# Patient Record
Sex: MALE | Race: BLACK OR AFRICAN AMERICAN | Employment: UNEMPLOYED | URBAN - METROPOLITAN AREA
[De-identification: names, ages, dates, MRNs, and addresses within clinical notes are randomized per-mention and may not be internally consistent; named-entity substitution may affect disease eponyms.]

---

## 2020-06-06 ENCOUNTER — HOSPITAL ENCOUNTER (EMERGENCY)
Age: 50
Discharge: HOME | End: 2020-06-06
Attending: EMERGENCY MEDICINE
Payer: COMMERCIAL

## 2020-06-06 VITALS
HEIGHT: 72 IN | SYSTOLIC BLOOD PRESSURE: 157 MMHG | RESPIRATION RATE: 17 BRPM | WEIGHT: 220 LBS | OXYGEN SATURATION: 96 % | HEART RATE: 92 BPM | TEMPERATURE: 97.7 F | BODY MASS INDEX: 29.8 KG/M2 | DIASTOLIC BLOOD PRESSURE: 102 MMHG

## 2020-06-06 PROCEDURE — 6360000002 HC RX W HCPCS: Performed by: EMERGENCY MEDICINE

## 2020-06-06 PROCEDURE — 96372 THER/PROPH/DIAG INJ SC/IM: CPT

## 2020-06-06 PROCEDURE — 99283 EMERGENCY DEPT VISIT LOW MDM: CPT

## 2020-06-06 RX ORDER — TRIAMCINOLONE ACETONIDE 40 MG/ML
80 INJECTION, SUSPENSION INTRA-ARTICULAR; INTRAMUSCULAR ONCE
Status: COMPLETED | OUTPATIENT
Start: 2020-06-06 | End: 2020-06-06

## 2020-06-06 RX ORDER — CYCLOBENZAPRINE HCL 10 MG
10 TABLET ORAL 3 TIMES DAILY PRN
COMMUNITY
End: 2020-12-18 | Stop reason: ALTCHOICE

## 2020-06-06 RX ORDER — ALLOPURINOL 100 MG/1
100 TABLET ORAL DAILY
COMMUNITY
End: 2020-07-08

## 2020-06-06 RX ORDER — ATORVASTATIN CALCIUM 20 MG/1
20 TABLET, FILM COATED ORAL DAILY
COMMUNITY
End: 2020-07-13 | Stop reason: SDUPTHER

## 2020-06-06 RX ADMIN — TRIAMCINOLONE ACETONIDE 80 MG: 40 INJECTION, SUSPENSION INTRA-ARTICULAR; INTRAMUSCULAR at 21:31

## 2020-06-07 NOTE — ED NOTES
Nursing Discharge Notes:    -Patient discharged at this time in no acute distress after verbalizing understanding of discharge instructions and need for follow up with PCP and/or specialist if one was referred.  -A copy of the AVS was reviewed with pt and/or family.  -Pt received applicable scripts which were reviewed with pt and/or family by this RN. -Pt was given the opportunity to ask questions before signing for discharge. Patient Mobility at Discharge:    -Pt left ambulatory to lobby / discharge area. -Wheelchair offered and was declined. Patient Education:    Learner - Patient and/or family / caregiver. Motivation and Readiness To Learn - Medium to High  Barriers To Learning - None  Learning Preference / Provided Instructions - Both written and verbal discharge instructions.      Krystle Montana RN  06/06/20 9326

## 2020-06-08 ENCOUNTER — CARE COORDINATION (OUTPATIENT)
Dept: CARE COORDINATION | Age: 50
End: 2020-06-08

## 2020-06-08 NOTE — CARE COORDINATION
severity of symptoms and risk factors. Patient has a telephone f/u appointment with his PCP at the [place] on 6/9/20. He also has an appointment with ENT on 6/18/20. Declines needing any assistance with scheduling any f/u appointments.

## 2020-06-22 ENCOUNTER — CARE COORDINATION (OUTPATIENT)
Dept: CARE COORDINATION | Age: 50
End: 2020-06-22

## 2020-06-24 ENCOUNTER — TELEPHONE (OUTPATIENT)
Dept: FAMILY MEDICINE CLINIC | Age: 50
End: 2020-06-24

## 2020-06-24 NOTE — TELEPHONE ENCOUNTER
The following patient is requesting a new patient appointment    With Provider: any    Insurance: caresource    Medications / Conditions / Complaint : diabetic/cholesterol/high bp  Current issue being allergies    Preferred Days: any    Preferred Time: afternoon    Best Contact Number: [phone]

## 2020-07-06 ENCOUNTER — OFFICE VISIT (OUTPATIENT)
Dept: FAMILY MEDICINE CLINIC | Age: 50
End: 2020-07-06
Payer: COMMERCIAL

## 2020-07-06 VITALS
HEART RATE: 77 BPM | WEIGHT: 212.2 LBS | OXYGEN SATURATION: 98 % | SYSTOLIC BLOOD PRESSURE: 130 MMHG | HEIGHT: 70 IN | BODY MASS INDEX: 30.38 KG/M2 | DIASTOLIC BLOOD PRESSURE: 82 MMHG

## 2020-07-06 PROCEDURE — 99386 PREV VISIT NEW AGE 40-64: CPT | Performed by: FAMILY MEDICINE

## 2020-07-06 RX ORDER — FLUTICASONE PROPIONATE 50 MCG
1 SPRAY, SUSPENSION (ML) NASAL DAILY
COMMUNITY
End: 2022-06-28 | Stop reason: SDUPTHER

## 2020-07-06 RX ORDER — ALBUTEROL SULFATE 90 UG/1
2 AEROSOL, METERED RESPIRATORY (INHALATION) EVERY 6 HOURS PRN
COMMUNITY
End: 2022-06-28 | Stop reason: SDUPTHER

## 2020-07-06 RX ORDER — IBUPROFEN 600 MG/1
600 TABLET ORAL EVERY 6 HOURS PRN
COMMUNITY
End: 2021-02-03 | Stop reason: ALTCHOICE

## 2020-07-06 RX ORDER — IBUPROFEN 600 MG/1
600 TABLET ORAL EVERY 8 HOURS PRN
Qty: 30 TABLET | Refills: 0 | Status: SHIPPED | OUTPATIENT
Start: 2020-07-06 | End: 2022-06-28

## 2020-07-06 RX ORDER — CETIRIZINE HYDROCHLORIDE 10 MG/1
10 TABLET ORAL DAILY
COMMUNITY
End: 2022-06-28

## 2020-07-06 ASSESSMENT — PATIENT HEALTH QUESTIONNAIRE - PHQ9
2. FEELING DOWN, DEPRESSED OR HOPELESS: 1
SUM OF ALL RESPONSES TO PHQ9 QUESTIONS 1 & 2: 2
SUM OF ALL RESPONSES TO PHQ QUESTIONS 1-9: 2
SUM OF ALL RESPONSES TO PHQ QUESTIONS 1-9: 2
1. LITTLE INTEREST OR PLEASURE IN DOING THINGS: 1

## 2020-07-07 DIAGNOSIS — Z00.00 PHYSICAL EXAM, ANNUAL: ICD-10-CM

## 2020-07-07 DIAGNOSIS — K59.00 CONSTIPATION, UNSPECIFIED CONSTIPATION TYPE: ICD-10-CM

## 2020-07-07 DIAGNOSIS — M1A.0720 IDIOPATHIC CHRONIC GOUT OF LEFT FOOT WITHOUT TOPHUS: ICD-10-CM

## 2020-07-08 LAB
ANION GAP SERPL CALCULATED.3IONS-SCNC: 17 MMOL/L (ref 3–16)
BASOPHILS ABSOLUTE: 0.1 K/UL (ref 0–0.2)
BASOPHILS RELATIVE PERCENT: 1.2 %
BUN BLDV-MCNC: 15 MG/DL (ref 7–20)
CALCIUM SERPL-MCNC: 10.3 MG/DL (ref 8.3–10.6)
CHLORIDE BLD-SCNC: 100 MMOL/L (ref 99–110)
CHOLESTEROL, TOTAL: 248 MG/DL (ref 0–199)
CO2: 24 MMOL/L (ref 21–32)
CREAT SERPL-MCNC: 1.1 MG/DL (ref 0.9–1.3)
EOSINOPHILS ABSOLUTE: 0.2 K/UL (ref 0–0.6)
EOSINOPHILS RELATIVE PERCENT: 1.6 %
GFR AFRICAN AMERICAN: >60
GFR NON-AFRICAN AMERICAN: >60
GLUCOSE BLD-MCNC: 133 MG/DL (ref 70–99)
HCT VFR BLD CALC: 48.1 % (ref 40.5–52.5)
HDLC SERPL-MCNC: 52 MG/DL (ref 40–60)
HEMOGLOBIN: 15.3 G/DL (ref 13.5–17.5)
LDL CHOLESTEROL CALCULATED: 166 MG/DL
LYMPHOCYTES ABSOLUTE: 3.9 K/UL (ref 1–5.1)
LYMPHOCYTES RELATIVE PERCENT: 40.7 %
MCH RBC QN AUTO: 28.4 PG (ref 26–34)
MCHC RBC AUTO-ENTMCNC: 31.9 G/DL (ref 31–36)
MCV RBC AUTO: 88.9 FL (ref 80–100)
MONOCYTES ABSOLUTE: 0.5 K/UL (ref 0–1.3)
MONOCYTES RELATIVE PERCENT: 5.7 %
NEUTROPHILS ABSOLUTE: 4.9 K/UL (ref 1.7–7.7)
NEUTROPHILS RELATIVE PERCENT: 50.8 %
PDW BLD-RTO: 14.6 % (ref 12.4–15.4)
PLATELET # BLD: 273 K/UL (ref 135–450)
PMV BLD AUTO: 9.9 FL (ref 5–10.5)
POTASSIUM SERPL-SCNC: 4.2 MMOL/L (ref 3.5–5.1)
RBC # BLD: 5.41 M/UL (ref 4.2–5.9)
SODIUM BLD-SCNC: 141 MMOL/L (ref 136–145)
TRIGL SERPL-MCNC: 150 MG/DL (ref 0–150)
TSH REFLEX: 4 UIU/ML (ref 0.27–4.2)
URIC ACID, SERUM: 7.4 MG/DL (ref 3.5–7.2)
VLDLC SERPL CALC-MCNC: 30 MG/DL
WBC # BLD: 9.6 K/UL (ref 4–11)

## 2020-07-08 RX ORDER — ALLOPURINOL 100 MG/1
100 TABLET ORAL 2 TIMES DAILY
Qty: 60 TABLET | Refills: 0 | Status: SHIPPED | OUTPATIENT
Start: 2020-07-08 | End: 2020-08-03

## 2020-07-10 DIAGNOSIS — R73.01 ELEVATED FASTING GLUCOSE: ICD-10-CM

## 2020-07-10 LAB
ESTIMATED AVERAGE GLUCOSE: 157.1 MG/DL
HBA1C MFR BLD: 7.1 %

## 2020-07-13 ENCOUNTER — OFFICE VISIT (OUTPATIENT)
Dept: FAMILY MEDICINE CLINIC | Age: 50
End: 2020-07-13
Payer: COMMERCIAL

## 2020-07-13 VITALS
TEMPERATURE: 98.1 F | HEART RATE: 97 BPM | OXYGEN SATURATION: 98 % | WEIGHT: 205 LBS | BODY MASS INDEX: 29.41 KG/M2 | SYSTOLIC BLOOD PRESSURE: 150 MMHG | DIASTOLIC BLOOD PRESSURE: 99 MMHG

## 2020-07-13 PROCEDURE — 3051F HG A1C>EQUAL 7.0%<8.0%: CPT | Performed by: FAMILY MEDICINE

## 2020-07-13 PROCEDURE — G8417 CALC BMI ABV UP PARAM F/U: HCPCS | Performed by: FAMILY MEDICINE

## 2020-07-13 PROCEDURE — 99214 OFFICE O/P EST MOD 30 MIN: CPT | Performed by: FAMILY MEDICINE

## 2020-07-13 PROCEDURE — 1036F TOBACCO NON-USER: CPT | Performed by: FAMILY MEDICINE

## 2020-07-13 PROCEDURE — G8427 DOCREV CUR MEDS BY ELIG CLIN: HCPCS | Performed by: FAMILY MEDICINE

## 2020-07-13 PROCEDURE — 3017F COLORECTAL CA SCREEN DOC REV: CPT | Performed by: FAMILY MEDICINE

## 2020-07-13 PROCEDURE — 2022F DILAT RTA XM EVC RTNOPTHY: CPT | Performed by: FAMILY MEDICINE

## 2020-07-13 RX ORDER — ATORVASTATIN CALCIUM 40 MG/1
40 TABLET, FILM COATED ORAL DAILY
Qty: 90 TABLET | Refills: 3 | Status: SHIPPED | OUTPATIENT
Start: 2020-07-13 | End: 2021-10-04

## 2020-07-13 NOTE — PATIENT INSTRUCTIONS
Instructions. \"     If you do not have an account, please click on the \"Sign Up Now\" link. Current as of: August 22, 2019               Content Version: 12.5  © 4113-7264 [employer], [employer]orporated. Care instructions adapted under license by [facility] ([facility]). If you have questions about a medical condition or this instruction, always ask your healthcare professional. [address] any warranty or liability for your use of this information.

## 2020-07-13 NOTE — PROGRESS NOTES
Chief Complaint: Discuss Labs       HPI:  Reinaldo Duane is a 45 y.o. male here to discuss the labs. He has history of gout and his uric acid level was still elevated. He was taking allopurinol 100 mg daily. At times he still complains of pain in his knee and ankle joints. But no swelling or redness    He haS no history of hypertension. But today his blood pressure was elevated and he attributes to stress  He has blood pressure cuff at home and he would keep a log of the blood pressure    His lipid profile was abnormal he has been taking Lipitor 20 mg daily    His blood glucose was elevated. His A1c was 7. He never had any history of diabetes has a strong family history of type 2 diabetes    ROS:  Constitutional: Negative for appetite change, fatigue, fever and unexpected weight change. HENT: Negative   Respiratory: Negative for cough, chest tightness, shortness of breath and wheezing. Cardiovascular: Negative for chest pain, palpitations and leg swelling. Gastrointestinal: Negative for abdominal pain, blood in stool, constipation, diarrhea, nausea and vomiting. Genitourinary: Negative for difficulty urinating, flank pain, frequency, hematuria and urgency. Musculoskeletal: As mentioned above  Skin: Negative for color change, pallor, rash and wound. Neurological: Negative  Psychiatric/Behavioral: Negative     Patient's problem list, medications, allergies, past medical, surgical, social and family histories were reviewed and updated as appropriate.      Current Outpatient Medications   Medication Sig Dispense Refill    metFORMIN (GLUCOPHAGE) 500 MG tablet Take 1 tablet by mouth 2 times daily (with meals) 60 tablet 3    atorvastatin (LIPITOR) 40 MG tablet Take 1 tablet by mouth daily 90 tablet 3    allopurinol (ZYLOPRIM) 100 MG tablet Take 1 tablet by mouth 2 times daily 60 tablet 0    cetirizine (ZYRTEC) 10 MG tablet Take 10 mg by mouth daily      polyethyl glycol-propyl glycol 0.4-0.3 % (SYSTANE) 0.4-0.3 % ophthalmic solution 1 drop as needed for Dry Eyes      ibuprofen (ADVIL;MOTRIN) 600 MG tablet Take 600 mg by mouth every 6 hours as needed for Pain      diclofenac sodium (VOLTAREN) 1 % GEL Apply 2 g topically 2 times daily      albuterol sulfate HFA (VENTOLIN HFA) 108 (90 Base) MCG/ACT inhaler Inhale 2 puffs into the lungs every 6 hours as needed for Wheezing      fluticasone (FLONASE) 50 MCG/ACT nasal spray 1 spray by Each Nostril route daily      Hypertonic Nasal Wash (SINUS RINSE NA) by Nasal route      ibuprofen (ADVIL;MOTRIN) 600 MG tablet Take 1 tablet by mouth every 8 hours as needed for Pain 30 tablet 0    cyclobenzaprine (FLEXERIL) 10 MG tablet Take 10 mg by mouth 3 times daily as needed for Muscle spasms       No current facility-administered medications for this visit. Social History     Tobacco Use    Smoking status: Never Smoker    Smokeless tobacco: Never Used   Substance Use Topics    Alcohol use: Not Currently        Objective:     Vitals:    07/13/20 1101   BP: (!) 150/99   Pulse: 97   Temp: 98.1 °F (36.7 °C)   SpO2: 98%   Weight: 205 lb (93 kg)     Body mass index is 29.41 kg/m². Wt Readings from Last 3 Encounters:   07/13/20 205 lb (93 kg)   07/06/20 212 lb 3.2 oz (96.3 kg)   06/06/20 220 lb (99.8 kg)     BP Readings from Last 3 Encounters:   07/13/20 (!) 150/99   07/06/20 130/82   06/06/20 (!) 157/102       Physical exam:  Constitutional: he is oriented to person, place, and time. he appears well-developed and well-nourished. No distress. Cardiovascular: Normal rate, regular rhythm, normal heart sounds and intact distal pulses. No murmur heard. Pulmonary/Chest: Effort normal and breath sounds normal. No stridor. No respiratory distress. he has no wheezes. he has no rales. heexhibits no tenderness. Abdominal: Soft. Bowel sounds are normal. he exhibits no distension and no mass. There is no tenderness. There is no rebound and no guarding. Musculoskeletal: Normal range of motion. he exhibits no edema, tenderness or deformity. Neurological:he is alert and oriented to person, place, and time. he has gross neurological exam normal with normal strength and normal gait  Skin: Skin is warm and dry. No rash noted. he is not diaphoretic. No erythema. No pallor. Assessment/Plan:   1. Mixed hyperlipidemia  Increase the dose of Lipitor to 40 mg daily    2. Idiopathic chronic gout of right foot without tophus  Increase the dose of allopurinol 100 mg bid  And given the information about the diet    3. New onset type 2 diabetes mellitus ([facility] [address]  Started on metformin 500 mg bid and discussed about the diet and given information on the diabetes management and discussed about the signs of hypoglycemia and complications of diabetes    4. Elevated BP without diagnosis of hypertension  Advised to keep the log of the bp       Return in about 3 months (around 10/13/2020) for Chronic Medical Problems, Diabetes.       Manasa Haji  7/13/2020 11:26 AM

## 2020-08-03 RX ORDER — ALLOPURINOL 100 MG/1
TABLET ORAL
Qty: 60 TABLET | Refills: 5 | Status: SHIPPED | OUTPATIENT
Start: 2020-08-03 | End: 2020-10-12 | Stop reason: SDUPTHER

## 2020-08-03 NOTE — TELEPHONE ENCOUNTER
Medication:   Requested Prescriptions     Pending Prescriptions Disp Refills    allopurinol (ZYLOPRIM) 100 MG tablet [Pharmacy Med Name: ALLOPURINOL 100 MG TABLET] 60 tablet 0     Sig: TAKE 1 TABLET BY MOUTH TWICE A DAY        Last Filled:  7/8/2020 #60 w/0 refills     Patient Phone Number: [phone] (home)     Last appt: 7/13/2020   Next appt: 10/12/2020    Last OARRS: No flowsheet data found.

## 2020-10-12 ENCOUNTER — OFFICE VISIT (OUTPATIENT)
Dept: FAMILY MEDICINE CLINIC | Age: 50
End: 2020-10-12
Payer: COMMERCIAL

## 2020-10-12 VITALS
TEMPERATURE: 97.5 F | OXYGEN SATURATION: 97 % | SYSTOLIC BLOOD PRESSURE: 130 MMHG | BODY MASS INDEX: 29.5 KG/M2 | DIASTOLIC BLOOD PRESSURE: 74 MMHG | HEART RATE: 72 BPM | WEIGHT: 205.63 LBS

## 2020-10-12 PROBLEM — E11.9 TYPE 2 DIABETES MELLITUS WITHOUT COMPLICATION, WITHOUT LONG-TERM CURRENT USE OF INSULIN (HCC): Status: ACTIVE | Noted: 2020-10-12

## 2020-10-12 PROBLEM — M1A.0790 CHRONIC GOUT OF ANKLE: Status: ACTIVE | Noted: 2020-10-12

## 2020-10-12 PROBLEM — E78.2 MIXED HYPERLIPIDEMIA: Status: ACTIVE | Noted: 2020-10-12

## 2020-10-12 LAB
CREATININE URINE POCT: NORMAL
HBA1C MFR BLD: 6.5 %
MICROALBUMIN/CREAT 24H UR: NORMAL MG/G{CREAT}
MICROALBUMIN/CREAT UR-RTO: NORMAL

## 2020-10-12 PROCEDURE — 83036 HEMOGLOBIN GLYCOSYLATED A1C: CPT | Performed by: FAMILY MEDICINE

## 2020-10-12 PROCEDURE — G8482 FLU IMMUNIZE ORDER/ADMIN: HCPCS | Performed by: FAMILY MEDICINE

## 2020-10-12 PROCEDURE — 90750 HZV VACC RECOMBINANT IM: CPT | Performed by: FAMILY MEDICINE

## 2020-10-12 PROCEDURE — 90471 IMMUNIZATION ADMIN: CPT | Performed by: FAMILY MEDICINE

## 2020-10-12 PROCEDURE — 3017F COLORECTAL CA SCREEN DOC REV: CPT | Performed by: FAMILY MEDICINE

## 2020-10-12 PROCEDURE — 82044 UR ALBUMIN SEMIQUANTITATIVE: CPT | Performed by: FAMILY MEDICINE

## 2020-10-12 PROCEDURE — 3044F HG A1C LEVEL LT 7.0%: CPT | Performed by: FAMILY MEDICINE

## 2020-10-12 PROCEDURE — 90686 IIV4 VACC NO PRSV 0.5 ML IM: CPT | Performed by: FAMILY MEDICINE

## 2020-10-12 PROCEDURE — G8417 CALC BMI ABV UP PARAM F/U: HCPCS | Performed by: FAMILY MEDICINE

## 2020-10-12 PROCEDURE — G8427 DOCREV CUR MEDS BY ELIG CLIN: HCPCS | Performed by: FAMILY MEDICINE

## 2020-10-12 PROCEDURE — 1036F TOBACCO NON-USER: CPT | Performed by: FAMILY MEDICINE

## 2020-10-12 PROCEDURE — 99214 OFFICE O/P EST MOD 30 MIN: CPT | Performed by: FAMILY MEDICINE

## 2020-10-12 PROCEDURE — 2022F DILAT RTA XM EVC RTNOPTHY: CPT | Performed by: FAMILY MEDICINE

## 2020-10-12 PROCEDURE — 90472 IMMUNIZATION ADMIN EACH ADD: CPT | Performed by: FAMILY MEDICINE

## 2020-10-12 RX ORDER — ALLOPURINOL 100 MG/1
TABLET ORAL
Qty: 60 TABLET | Refills: 5 | Status: SHIPPED | OUTPATIENT
Start: 2020-10-12 | End: 2020-12-17 | Stop reason: SDUPTHER

## 2020-10-12 RX ORDER — LATANOPROST 50 UG/ML
SOLUTION/ DROPS OPHTHALMIC
COMMUNITY
Start: 2020-08-11 | End: 2022-06-28 | Stop reason: ALTCHOICE

## 2020-10-12 RX ORDER — GLUCOSAMINE HCL/CHONDROITIN SU 500-400 MG
CAPSULE ORAL
Qty: 100 STRIP | Refills: 1 | Status: SHIPPED | OUTPATIENT
Start: 2020-10-12

## 2020-10-12 NOTE — PROGRESS NOTES
Vaccine Information Sheet, \"Influenza - Inactivated\"  given to Chan Downs, or parent/legal guardian of  Chan Downs and verbalized understanding. Patient responses:    Have you ever had a reaction to a flu vaccine? No  Do you have any current illness? No  Have you ever had Guillian Bon Aqua Syndrome? No  Do you have a serious allergy to any of the follow: Neomycin, Polymyxin, Thimerosal, eggs or egg products? No    Flu vaccine given per order. Please see immunization tab. Risks and benefits explained. Current VIS given.

## 2020-10-12 NOTE — PROGRESS NOTES
Chief Complaint: Follow-up (3 month follow up diabetes)       HPI:  Suki Fontenot is a 45 y.o. male here for follow-up type 2 diabetes    His A1c has improved from 7-6.5. Currently has been taking Metformin 500 mg twice daily denies any side effects from the medication. Been checking his BGM regularly. His blood pressure has been stable. He has changed his diet and has been exercising. His gout is well controlled. He is currently taking allopurinol 100 mg daily    He is due for Shingrix shot and flu shot    ROS:  Constitutional: Negative for appetite change, fatigue, fever and unexpected weight change. Respiratory: Negative for cough, chest tightness, shortness of breath and wheezing. Cardiovascular: Negative for chest pain, palpitations and leg swelling. Gastrointestinal: Negative for abdominal pain, blood in stool, constipation, diarrhea, nausea and vomiting. Genitourinary: Negative for difficulty urinating, flank pain, frequency, hematuria and urgency. Musculoskeletal: Negative for gait problem, joint swelling and myalgias. Skin: Negative for color change, pallor, rash and wound. Neurological: Negative for dizziness, tremors, seizures, syncope, facial asymmetry, speech difficulty, weakness, light-headedness, numbness and headaches. Psychiatric/Behavioral: Negative     Patient's problem list, medications, allergies, past medical, surgical, social and family histories were reviewed and updated as appropriate. Current Outpatient Medications   Medication Sig Dispense Refill    latanoprost (XALATAN) 0.005 % ophthalmic solution INSTILL ONE DROP IN BOTH EYES AT BEDTIME      blood glucose monitor strips Test 1 times a day & as needed for symptoms of irregular blood glucose. Dispense sufficient amount for indicated testing frequency plus additional to accommodate PRN testing needs.  100 strip 1    Blood Glucose Monitoring Suppl (D-CARE GLUCOMETER) w/Device KIT 1 applicator by Does not Normal range of motion. No JVD present. No tracheal deviation present. No thyromegaly present. Cardiovascular: Normal rate, regular rhythm, normal heart sounds and intact distal pulses. No murmur heard. Pulmonary/Chest: Effort normal and breath sounds normal. No stridor. No respiratory distress. he has no wheezes. he has no rales. heexhibits no tenderness. Abdominal: Soft. Bowel sounds are normal. he exhibits no distension and no mass. There is no tenderness. There is no rebound and no guarding. Neurological:he is alert and oriented to person, place, and time. he has gross neurological exam normal with normal strength and normal gait  Skin: Skin is warm and dry. No rash noted. he is not diaphoretic. No erythema. No pallor. Psychiatric: he has a normal mood and affect. his   behavior is normal.      Assessment/Plan:   1. Type 2 diabetes mellitus without complication, without long-term current use of insulin (HCC)  Improved and continue the same  - CBC Auto Differential; Future  - Basic Metabolic Panel; Future  - Lipid Panel; Future  - Hemoglobin A1C; Future  - POCT microalbumin    2. Flu vaccine need  - INFLUENZA, QUADV, 3 YRS AND OLDER, IM PF, PREFILL SYR OR SDV, 0.5ML (AFLURIA QUADV, PF)    3. Need for shingles vaccine  - Zoster Subunit (SHINGRIX)    4. Mixed hyperlipidemia  Stable and continue the same    5.  Chronic gout of ankle, unspecified cause, unspecified laterality  Stable and continue the allopurinol          Yuni Camilo  10/12/2020 4:09 PM

## 2020-10-13 RX ORDER — LANCETS 30 GAUGE
1 EACH MISCELLANEOUS DAILY
Qty: 100 EACH | Refills: 2 | Status: SHIPPED | OUTPATIENT
Start: 2020-10-13

## 2020-12-14 ENCOUNTER — TELEPHONE (OUTPATIENT)
Dept: FAMILY MEDICINE CLINIC | Age: 50
End: 2020-12-14

## 2020-12-14 ENCOUNTER — NURSE TRIAGE (OUTPATIENT)
Dept: OTHER | Facility: CLINIC | Age: 50
End: 2020-12-14

## 2020-12-14 ENCOUNTER — VIRTUAL VISIT (OUTPATIENT)
Dept: FAMILY MEDICINE CLINIC | Age: 50
End: 2020-12-14
Payer: COMMERCIAL

## 2020-12-14 PROCEDURE — G8427 DOCREV CUR MEDS BY ELIG CLIN: HCPCS | Performed by: FAMILY MEDICINE

## 2020-12-14 PROCEDURE — 99213 OFFICE O/P EST LOW 20 MIN: CPT | Performed by: FAMILY MEDICINE

## 2020-12-14 PROCEDURE — 3017F COLORECTAL CA SCREEN DOC REV: CPT | Performed by: FAMILY MEDICINE

## 2020-12-14 RX ORDER — DOCUSATE SODIUM 100 MG/1
100 CAPSULE, LIQUID FILLED ORAL DAILY PRN
Qty: 30 CAPSULE | Refills: 0 | Status: SHIPPED | OUTPATIENT
Start: 2020-12-14 | End: 2021-01-29

## 2020-12-14 NOTE — PROGRESS NOTES
2020    TELEHEALTH EVALUATION -- Audio/Visual (During FJWNN-20 public health emergency)    HPI:    Joe Christy (:  1970) has requested an audio/video evaluation for the following concern(s): Pain in his neck radiating to his left shoulder  Ongoing since 3 months  He had a x-ray done which did show disc degeneration  He is not sure which level he has the disc degeneration. He was seen at [facility].  Was given muscle relaxant steroid and NSAID  But none of them has helped  He has done physical therapy for 2 months and it has not helped  He still continues to have the pain and tingling going to his left shoulder and hand. He also has been having hemorrhoids  He has noted blood in his stools. Denies any history of constipation    Review of Systems:  Gen:  Denies fever, chills, headaches. No weight loss  HEENT:  Denies cold symptoms, sore throat. CV:  Denies chest pain or tightness, palpitations. Pulm:  Denies shortness of breath, cough. Abd: As mentioned    Prior to Visit Medications    Medication Sig Taking?  Authorizing Provider   docusate sodium (COLACE) 100 MG capsule Take 1 capsule by mouth daily as needed for Constipation Yes Nickolas Mahmood MD   metFORMIN (GLUCOPHAGE) 500 MG tablet TAKE 1 TABLET BY MOUTH TWICE A DAY WITH MEALS Yes Nickolas Mahmood MD   Blood Glucose Monitoring Suppl KIT 1 kit by Does not apply route daily Dispense according to insurance formulary Yes Nickolas Mahmood MD   blood glucose test strips (ASCENSIA AUTODISC VI;ONE TOUCH ULTRA TEST VI) strip 1 each by In Vitro route daily Test as directed,Dispense according to insurance formulary Yes Nickolas Mahmood MD   Lancets MISC 1 each by Does not apply route daily Test as directed, Dispense according to insurance formulary Yes Nickolas Mahmood MD   latanoprost (XALATAN) 0.005 % ophthalmic solution INSTILL ONE DROP IN BOTH EYES AT BEDTIME Yes Historical Provider, MD blood glucose monitor strips Test 1 times a day & as needed for symptoms of irregular blood glucose. Dispense sufficient amount for indicated testing frequency plus additional to accommodate PRN testing needs. Yes Javid Metz MD   Blood Glucose Monitoring Suppl (D-CARE GLUCOMETER) w/Device KIT 1 applicator by Does not apply route daily Yes Javid Metz MD   allopurinol (ZYLOPRIM) 100 MG tablet TAKE 1 TABLET BY MOUTH TWICE A DAY Yes Javid Metz MD   cetirizine (ZYRTEC) 10 MG tablet Take 10 mg by mouth daily Yes Historical Provider, MD   polyethyl glycol-propyl glycol 0.4-0.3 % (SYSTANE) 0.4-0.3 % ophthalmic solution 1 drop as needed for Dry Eyes Yes Historical Provider, MD   ibuprofen (ADVIL;MOTRIN) 600 MG tablet Take 600 mg by mouth every 6 hours as needed for Pain Yes Historical Provider, MD   diclofenac sodium (VOLTAREN) 1 % GEL Apply 2 g topically 2 times daily Yes Historical Provider, MD   albuterol sulfate HFA (VENTOLIN HFA) 108 (90 Base) MCG/ACT inhaler Inhale 2 puffs into the lungs every 6 hours as needed for Wheezing Yes Historical Provider, MD   fluticasone (FLONASE) 50 MCG/ACT nasal spray 1 spray by Each Nostril route daily Yes Historical Provider, MD   Hypertonic Nasal Wash (SINUS RINSE NA) by Nasal route Yes Historical Provider, MD   cyclobenzaprine (FLEXERIL) 10 MG tablet Take 10 mg by mouth 3 times daily as needed for Muscle spasms Yes Historical Provider, MD   atorvastatin (LIPITOR) 40 MG tablet Take 1 tablet by mouth daily  Javid Metz MD   ibuprofen (ADVIL;MOTRIN) 600 MG tablet Take 1 tablet by mouth every 8 hours as needed for Pain  Javid Metz MD       Past Medical History:   Diagnosis Date    Enlarged prostate        No past surgical history on file.     Family History   Problem Relation Age of Onset    Prostate Cancer Father        No Known Allergies    Social History     Tobacco Use    Smoking status: Never Smoker    Smokeless tobacco: Never Used Substance Use Topics    Alcohol use: Not Currently    Drug use: Never          PHYSICAL EXAMINATION:  Vital Signs:   Patient-Reported Vitals 12/14/2020   Patient-Reported Weight 206   Patient-Reported Temperature 97.5        Respiratory rate appears normal      Constitutional: Appears well-developed and well-nourished. No apparent distress    Mental status: Alert and awake. Oriented to person/place/time. Able to follow commands    HENT: Normocephalic, atraumatic. Mouth/Throat: Mucous membranes are moist. External Ears Normal    Neck: No visualized mass   Pulmonary/Chest: Respiratory effort normal.  No visualized signs of difficulty breathing or respiratory distress        Musculoskeletal: rom at his neck and experience tingling sensation in his arm           ASSESSMENT/PLAN:  1. Degeneration of cervical intervertebral disc  We will get the mri as no improvement with PT and steriods and muscle relaxants  - Chaya Dominguez MD, Spine Surgery, [address] [place] [address];  [facility]    2 Hemorrhoids  Advised to colace and if not better we will reassess Chan Downs is a 45 y.o. male being evaluated by a Virtual Visit (video visit) encounter to address concerns as mentioned above. A caregiver was present when appropriate. Due to this being a TeleHealth encounter (During FBROW-93 public health emergency), evaluation of the following organ systems was limited: Vitals/Constitutional/EENT/Resp/CV/GI//MS/Neuro/Skin/Heme-Lymph-Imm. Pursuant to the emergency declaration under the [address] [place] [address], [address] [place] [address] and the [employer] and [employer] Act, this Virtual Visit was conducted with patient's (and/or legal guardian's) consent, to reduce the patient's risk of exposure to COVID-19 and provide necessary medical care. The patient (and/or legal guardian) has also been advised to contact this office for worsening conditions or problems, and seek emergency medical treatment and/or call 911 if deemed necessary. Patient identification was verified at the start of the visit: Yes    Total time spent on this encounter: 15 minutes. Services were provided through a video synchronous discussion virtually to substitute for in-person clinic visit. Patient was located in their home. Provider was located in the office. --Sara Collins MD on 12/14/2020 at 5:35 PM    An electronic signature was used to authenticate this note. Sean Mendoza

## 2020-12-14 NOTE — TELEPHONE ENCOUNTER
Reason for Disposition   Numbness or tingling on both sides of body and is a new symptom lasting > 24 hours    Answer Assessment - Initial Assessment Questions  1. SYMPTOM: \"What is the main symptom you are concerned about? \" (e.g., weakness, numbness)      Left neck, shoulder, arm pain and numbness an tingling. 2. ONSET: \"When did this start? \" (minutes, hours, days; while sleeping)      Ongoing for approx 1year, but worse within the last 4 days. 3. LAST NORMAL: \"When was the last time you were normal (no symptoms)? \"      Over 1 year ago. 4. PATTERN \"Does this come and go, or has it been constant since it started? \"  \"Is it present now? \"      Constant most of the time. States it does go away for a few minutes. 5. CARDIAC SYMPTOMS: \"Have you had any of the following symptoms: chest pain, difficulty breathing, palpitations? \"      Denies    6. NEUROLOGIC SYMPTOMS: \"Have you had any of the following symptoms: headache, dizziness, vision loss, double vision, changes in speech, unsteady on your feet? \"      Numbness, tingling and pain in left shoulder, arm and left neck    7. OTHER SYMPTOMS: \"Do you have any other symptoms? \"      Denies    8. PREGNANCY: \"Is there any chance you are pregnant? \" \"When was your last menstrual period? \"      N/A    Protocols used: NEUROLOGIC DEFICIT-ADULT-[place]    Pt reports having left neck, left shoulder, left arm pain, numbness and tingling for approx 1 year. Has had therapy and been told that he was told that he had two compressed discs. Reviewed for pt to have appt with PCP within 3 days or be seen at [facility] if no appts available. [facility] [address] provided care advice and instructed to call back with worsening symptoms. Attention Provider: Thank you for allowing me to participate in the care of your patient. The patient was connected to triage in response to information provided to the [facility].   Please do not respond through this encounter as the response is not directed to a shared pool.

## 2020-12-15 ENCOUNTER — TELEPHONE (OUTPATIENT)
Dept: ORTHOPEDIC SURGERY | Age: 50
End: 2020-12-15

## 2020-12-17 RX ORDER — ALLOPURINOL 100 MG/1
TABLET ORAL
Qty: 60 TABLET | Refills: 5 | Status: SHIPPED | OUTPATIENT
Start: 2020-12-17 | End: 2020-12-22

## 2020-12-17 NOTE — TELEPHONE ENCOUNTER
Medication and Quantity requested: allopurinol (ZYLOPRIM) 100 MG tablet  QTY:180     Last Visit  12/14    Pharmacy and phone number updated in [facility]:  Yes [facility] [id number]

## 2020-12-18 ENCOUNTER — OFFICE VISIT (OUTPATIENT)
Dept: ORTHOPEDIC SURGERY | Age: 50
End: 2020-12-18
Payer: COMMERCIAL

## 2020-12-18 VITALS — TEMPERATURE: 97.2 F | WEIGHT: 206 LBS | HEIGHT: 72 IN | BODY MASS INDEX: 27.9 KG/M2 | RESPIRATION RATE: 12 BRPM

## 2020-12-18 PROCEDURE — G8427 DOCREV CUR MEDS BY ELIG CLIN: HCPCS | Performed by: STUDENT IN AN ORGANIZED HEALTH CARE EDUCATION/TRAINING PROGRAM

## 2020-12-18 PROCEDURE — G8482 FLU IMMUNIZE ORDER/ADMIN: HCPCS | Performed by: STUDENT IN AN ORGANIZED HEALTH CARE EDUCATION/TRAINING PROGRAM

## 2020-12-18 PROCEDURE — G8417 CALC BMI ABV UP PARAM F/U: HCPCS | Performed by: STUDENT IN AN ORGANIZED HEALTH CARE EDUCATION/TRAINING PROGRAM

## 2020-12-18 PROCEDURE — 99244 OFF/OP CNSLTJ NEW/EST MOD 40: CPT | Performed by: STUDENT IN AN ORGANIZED HEALTH CARE EDUCATION/TRAINING PROGRAM

## 2020-12-18 RX ORDER — METHYLPREDNISOLONE 4 MG/1
TABLET ORAL
Qty: 1 KIT | Refills: 0 | Status: SHIPPED | OUTPATIENT
Start: 2020-12-18 | End: 2020-12-24

## 2020-12-18 RX ORDER — CYCLOBENZAPRINE HCL 10 MG
10 TABLET ORAL NIGHTLY PRN
Qty: 30 TABLET | Refills: 0 | Status: SHIPPED | OUTPATIENT
Start: 2020-12-18 | End: 2021-01-17

## 2020-12-18 NOTE — PROGRESS NOTES
New Patient: SPINE    Referring Provider:  Fazal Simmons MD    CHIEF COMPLAINT:    Chief Complaint   Patient presents with    Neck Pain     NP, CSP. No TANISHA. Pain and symptoms ongoing minimum 2 years, with worsening symptoms 8-9 days. He notes doing PT at [address] [place] [address] 1.5 to 2 years ago. No recent treatments for symptoms. MRI CSP scheduled for 12/23/20. No h/o of cervical spine surgery. No bowel or bladder symptoms.  Arm Pain     Left arm pain & numbness/tingling. He reports issues at times with lifting things with his left arm/hand. Left arm weakness present. He reports resting arm on a pillow relieves his symptoms. HISTORY OF PRESENT ILLNESS:      · The patient is being sent at the request of Fazal Simmons MD in consultation as a new spine patient for neck pain and left arm pain. The patient is a 45 y.o. male whom reports neck pain with radiation to the left arm. The patient denies any injury or event that may have caused the symptoms. The patient has no history of cervical spine surgery. The pain started 2 years ago and has been worsening in the last 8 to 9 days. The patient rates his pain a 4/10 describes it as sharp, throbbing, aching, numbness and tingling. Pain radiates to the left shoulder and jaw and is associated with numbness in the arm and hand. Bending, sleeping and lifting aggravate his symptoms. The patient has done physical therapy at [facility] 1-1/2 years ago. The patient also has a traction machine at home which does relieve his symptoms temporarily. He is having difficulty with lifting with his left arm and hand. The patient does have an MRI of the cervical spine scheduled for 12/23/2020. Resting his arm in a pillow relieves his symptoms. He has not had any other recent treatment for symptoms. He has tried Ibuprofen, diclofenac and muscle relaxants in the past which did help. He does not present with any aids or braces today in the office. Pain Assessment  Location of Pain: Neck  Location Modifiers: Posterior, Left(Arm)  Severity of Pain: 4  Quality of Pain: Sharp, Throbbing, Aching, Other (Comment)(Numbness/tingling)  Duration of Pain: Persistent  Frequency of Pain: Constant  Aggravating Factors: Other (Comment), Bending(Sleeping; Lifting)  Limiting Behavior: Yes  Relieving Factors: Rest  Result of Injury: No  Work-Related Injury: No  Are there other pain locations you wish to document?: No      Associated signs and symptoms:   Neurogenic bowel or bladder symptoms:  no   Perceived weakness:  no   Difficulty walking:  no    Recent Imaging (within past one year)   Xrays: no   MRI or CT of spine: no    Current/Past Treatment:   · Physical Therapy:  yes  · Chiropractic:  none  · Injection:  none  · Medications:   NSAIDS:  yes   Muscle relaxer:  yes   Steriods:  none   Neuropathic medications:  none   Opioids:  none  · Previous surgery:  no  · Previous surgical consult:  no  · Other:  · Infection control  · Tested positive for MRSA in past 12 months:  no  · Tested positive for MSSA \"staph infection\" in past 12 months: no  · Tested positive for VRE (Vancomycin Resistant Enterococci) in past 12 months:   no  · Currently on any antibiotics for an infection: no  · Anticoagulants:  · On a blood thinner:  no   · Any history of bleeding disorder: no   · MRI Contraindication: no   · Previous Pain Management: no                   Past Medical History:   Past Medical History:   Diagnosis Date    Enlarged prostate       Past Surgical History:     History reviewed. No pertinent surgical history.   Current Medications:     Current Outpatient Medications:     cyclobenzaprine (FLEXERIL) 10 MG tablet, Take 1 tablet by mouth nightly as needed for Muscle spasms, Disp: 30 tablet, Rfl: 0    methylPREDNISolone (MEDROL, ONEIDA,) 4 MG tablet, Take by mouth., Disp: 1 kit, Rfl: 0    allopurinol (ZYLOPRIM) 100 MG tablet, TAKE 1 TABLET BY MOUTH TWICE A DAY, Disp: 60 tablet, Rfl: 5   docusate sodium (COLACE) 100 MG capsule, Take 1 capsule by mouth daily as needed for Constipation, Disp: 30 capsule, Rfl: 0    metFORMIN (GLUCOPHAGE) 500 MG tablet, TAKE 1 TABLET BY MOUTH TWICE A DAY WITH MEALS, Disp: 180 tablet, Rfl: 1    Blood Glucose Monitoring Suppl KIT, 1 kit by Does not apply route daily Dispense according to insurance formulary, Disp: 1 kit, Rfl: 0    blood glucose test strips (ASCENSIA AUTODISC VI;ONE TOUCH ULTRA TEST VI) strip, 1 each by In Vitro route daily Test as directed,Dispense according to insurance formulary, Disp: 100 each, Rfl: 2    Lancets MISC, 1 each by Does not apply route daily Test as directed, Dispense according to insurance formulary, Disp: 100 each, Rfl: 2    latanoprost (XALATAN) 0.005 % ophthalmic solution, INSTILL ONE DROP IN BOTH EYES AT BEDTIME, Disp: , Rfl:     blood glucose monitor strips, Test 1 times a day & as needed for symptoms of irregular blood glucose. Dispense sufficient amount for indicated testing frequency plus additional to accommodate PRN testing needs. , Disp: 100 strip, Rfl: 1    Blood Glucose Monitoring Suppl (D-CARE GLUCOMETER) w/Device KIT, 1 applicator by Does not apply route daily, Disp: 30 kit, Rfl: 0    cetirizine (ZYRTEC) 10 MG tablet, Take 10 mg by mouth daily, Disp: , Rfl:     polyethyl glycol-propyl glycol 0.4-0.3 % (SYSTANE) 0.4-0.3 % ophthalmic solution, 1 drop as needed for Dry Eyes, Disp: , Rfl:     ibuprofen (ADVIL;MOTRIN) 600 MG tablet, Take 600 mg by mouth every 6 hours as needed for Pain, Disp: , Rfl:     diclofenac sodium (VOLTAREN) 1 % GEL, Apply 2 g topically 2 times daily, Disp: , Rfl:     albuterol sulfate HFA (VENTOLIN HFA) 108 (90 Base) MCG/ACT inhaler, Inhale 2 puffs into the lungs every 6 hours as needed for Wheezing, Disp: , Rfl:     fluticasone (FLONASE) 50 MCG/ACT nasal spray, 1 spray by Each Nostril route daily, Disp: , Rfl:     Hypertonic Nasal Wash (SINUS RINSE NA), by Nasal route, Disp: , Rfl:   atorvastatin (LIPITOR) 40 MG tablet, Take 1 tablet by mouth daily, Disp: 90 tablet, Rfl: 3    ibuprofen (ADVIL;MOTRIN) 600 MG tablet, Take 1 tablet by mouth every 8 hours as needed for Pain, Disp: 30 tablet, Rfl: 0  Allergies:  Patient has no known allergies. Social History:    reports that he has never smoked. He has never used smokeless tobacco. He reports previous alcohol use. He reports that he does not use drugs. Family History:   Family History   Problem Relation Age of Onset    Prostate Cancer Father             REVIEW OF SYSTEMS: ROS - 14 point     Constitutional: No fevers, chills, night sweats, unexplained weight loss  Eye: No vision changes or diplopia  ENT: No nasal congestion, postnasal drip or sore throat. No tinnitus  Respiratory: No cough or SOB  CV: No chest pain or palpitations  GI: No nausea, abdominal pain, stool changes  : No dysuria or hematuria  Skin: No new or changing skin lesions, no rashes  MSK: No joint swelling, morning stiffness, unusual joint pain, + neck and right arm pain  Neurological: No headache, confusion, syncope  Psychiatric: No excessive anxiety or depression  Endocrine: No polyuria or polydipsia  Hematologic: No lymph node enlargement or excessive bleeding  Immunologic:No history of immune deficiency or immunomodulating drugs           PHYSICAL EXAM:    Vitals: Temperature 97.2 °F (36.2 °C), resp. rate 12, height 6' (1.829 m), weight 206 lb (93.4 kg). GENERAL EXAM:  · General Apparence: Patient is adequately groomed with no evidence of malnutrition. · Psychiatric: Orientation: The patient is oriented to time, place and person. The patient's mood and affect are appropriate   · Vascular: Examination reveals no swelling and palpation reveals no tenderness in upper or lower extremities. Good capillary refill.    · The lymphatic examination of the neck, axillae and groin reveals all areas to be without enlargement or induration Sensation is intact without deficit in the upper and lower extremities to light touch and pinprick  · Coordination of the upper and lower extremities are normal.    CERVICAL EXAMINATION:  · Inspection: Local inspection shows no step-off or bruising. Cervical alignment is normal. No instability is noted. · Palpation and Percussion: No evidence of tenderness at the midline. Paraspinal tenderness is not present. There is no paraspinal spasm. · Range of Motion:  limited by 25% in all planes due to pain   · Strength: 5/5 bilateral upper extremities  · Special Tests:   Spurling's positive left and Neely's are negative bilaterally. Tirado and Impingement tests are negative bilaterally. · Skin:There are no rashes, ulcerations or lesions. · Reflexes: Bilaterally triceps, biceps and brachioradialis are 1+. Clonus absent bilaterally at the feet. No pathological reflexes are noted. · Gait & station: normal, patient ambulates without assistance, no ataxia  · Additional Examinations:  · RIGHT UPPER EXTREMITY:  Inspection/examination of the right upper extremity does not show any tenderness, deformity or injury. Range of motion is normal and pain-free. There is no gross instability. There are no rashes, ulcerations or lesions. Strength and tone are normal. No atrophy or abnormal movements are noted. · LEFT UPPER EXTREMITY: Inspection/examination of the left upper extremity does not show any tenderness, deformity or injury. Range of motion is normal and pain-free. There is no gross instability. There are no rashes, ulcerations or lesions. Strength and tone are normal. No atrophy or abnormal movements are noted. LUMBAR/SACRAL EXAMINATION:  · Inspection: Local inspection shows no step-off or bruising. Lumbar alignment is normal. No instability is noted. 2. Cervical spondylosis without myelopathy    3. DDD (degenerative disc disease), cervical    4. Neck pain        Plan (Medical Decision Making):    I discussed the diagnosis and the treatment options with Gregory Aquino today. In Summary:  The various treatment options were outlined and discussed with Gregory Aquino including:  Conservative care options: physical therapy, ice, medications, bracing, and activity modification. The indications for therapeutic injections. The indications for additional imaging/laboratory studies. The indications for (possible future) interventions. After considering the various options discussed, Gregory Aquino elected to pursue a course of treatment that includes the followin. Medications: I will add a Flexeril 10 mg PO nightly to the current regimen. Counseled on risks, benefits and alternatives and recommended not to take the medicine and drive or operate heavy machinery. I will prescribe a medrol dose pack to help with the inflammatory component of pain. Risks, benefits and alternatives were discussed. Recommended to stop any NSAIDs to reduce risk of GI bleed during the course of the dose pack. 2. PT:  Encouraged to continue with HEP. 3. Further studies: The patient does have an MRI scheduled for 2020.    4. Interventional:  After further imaging is obtained, interventional options will be reviewed and recommended.   Patient may be a candidate for cervical epidural steroid injection if no improvement with Medrol Dosepak and if the MRI correlates with the history and physical.    5. Healthy Lifestyle Measures:  Patient education material reviewing the following was distributed to Gregory Aquino  Anatomic drawings  Healthy lifestyle education  Osteoporosis prevention,   Back and neck pain educational information   Advanced imaging preparedness    Posture education   Proper lifting and carrying techniques,   Weight management  Quitting smoking and Minor ways to treat back pain  For further information regarding the spine conditions and to review interventional treatments the patient was directed to [facility].    6.  Follow up:  1-2 weeks    Naida Rodriges was instructed to call the office if his symptoms worsen or if new symptoms appear prior to the next scheduled visit. He is specifically instructed to contact the office between now & his scheduled appointment if he has concerns related to his condition or if he needs assistance in scheduling the above tests. He is welcome to call for an appointment sooner if he has any additional concerns or questions. Roseanne Martinez PA-C  Board Certified by the [employer] on Certification of Physician Assistants  [id number]  Partner of [facility] ([facility] [place] [facility])       This dictation was performed with a verbal recognition program [facility]) and it was checked for errors. It is possible that there are still dictated errors within this office note. If so, please bring any errors to my attention for an addendum. All efforts were made to ensure that this office note is accurate.

## 2020-12-22 RX ORDER — ALLOPURINOL 100 MG/1
TABLET ORAL
Qty: 180 TABLET | Refills: 1 | Status: SHIPPED | OUTPATIENT
Start: 2020-12-22 | End: 2021-10-01

## 2020-12-22 NOTE — TELEPHONE ENCOUNTER
Medication:   Requested Prescriptions     Pending Prescriptions Disp Refills    allopurinol (ZYLOPRIM) 100 MG tablet [Pharmacy Med Name: ALLOPURINOL 100 MG TABLET] 180 tablet 1     Sig: TAKE 1 TABLET BY MOUTH TWICE A DAY        Last Filled:  12/17/2020 #60 Refills 5    Patient Phone Number: [phone] (home)     Last appt: 12/14/2020   Next appt: 4/12/2021    Last OARRS: No flowsheet data found.

## 2020-12-23 ENCOUNTER — TELEPHONE (OUTPATIENT)
Dept: FAMILY MEDICINE CLINIC | Age: 50
End: 2020-12-23

## 2020-12-23 NOTE — TELEPHONE ENCOUNTER
Please call them and let them know he is been seen by ortho and they recommend it too as his symptoms are not better

## 2020-12-24 NOTE — TELEPHONE ENCOUNTER
Patient is scheduled on Monday the 28th for MRI Cervical Spine. Please contact [facility] to complete the peer to peer review. Thank you.     [facility] [phone]  Case # [id number]

## 2020-12-29 NOTE — TELEPHONE ENCOUNTER
HANANE is still needing a peer to peer for approval. If the peer to peer isn't completed and approved the patient will need to cancel his appointment.     [facility] [phone]  Case # [id number]

## 2020-12-29 NOTE — TELEPHONE ENCOUNTER
[facility] [phone] Number to call   Case # [id number]. Left message for patient to call office back. Please read note below, thanks.

## 2021-01-08 ENCOUNTER — OFFICE VISIT (OUTPATIENT)
Dept: ORTHOPEDIC SURGERY | Age: 51
End: 2021-01-08
Payer: COMMERCIAL

## 2021-01-08 VITALS — HEIGHT: 72 IN | RESPIRATION RATE: 12 BRPM | WEIGHT: 206 LBS | BODY MASS INDEX: 27.9 KG/M2 | TEMPERATURE: 97.7 F

## 2021-01-08 DIAGNOSIS — M54.12 CERVICAL RADICULOPATHY: Primary | ICD-10-CM

## 2021-01-08 DIAGNOSIS — M47.812 CERVICAL SPONDYLOSIS WITHOUT MYELOPATHY: ICD-10-CM

## 2021-01-08 DIAGNOSIS — M50.30 DDD (DEGENERATIVE DISC DISEASE), CERVICAL: ICD-10-CM

## 2021-01-08 PROCEDURE — 1036F TOBACCO NON-USER: CPT | Performed by: STUDENT IN AN ORGANIZED HEALTH CARE EDUCATION/TRAINING PROGRAM

## 2021-01-08 PROCEDURE — 99214 OFFICE O/P EST MOD 30 MIN: CPT | Performed by: STUDENT IN AN ORGANIZED HEALTH CARE EDUCATION/TRAINING PROGRAM

## 2021-01-08 PROCEDURE — G8417 CALC BMI ABV UP PARAM F/U: HCPCS | Performed by: STUDENT IN AN ORGANIZED HEALTH CARE EDUCATION/TRAINING PROGRAM

## 2021-01-08 PROCEDURE — 3017F COLORECTAL CA SCREEN DOC REV: CPT | Performed by: STUDENT IN AN ORGANIZED HEALTH CARE EDUCATION/TRAINING PROGRAM

## 2021-01-08 PROCEDURE — G8427 DOCREV CUR MEDS BY ELIG CLIN: HCPCS | Performed by: STUDENT IN AN ORGANIZED HEALTH CARE EDUCATION/TRAINING PROGRAM

## 2021-01-08 PROCEDURE — G8482 FLU IMMUNIZE ORDER/ADMIN: HCPCS | Performed by: STUDENT IN AN ORGANIZED HEALTH CARE EDUCATION/TRAINING PROGRAM

## 2021-01-08 RX ORDER — METHYLPREDNISOLONE 4 MG/1
TABLET ORAL
Qty: 1 KIT | Refills: 0 | Status: SHIPPED | OUTPATIENT
Start: 2021-01-08 | End: 2021-01-14

## 2021-01-08 NOTE — PROGRESS NOTES
Follow up: Srinath Crandall  1970  [id number]      CHIEF COMPLAINT:    Chief Complaint   Patient presents with    Neck Pain     F/u CSP;  MRI CSP cx'd and not yet completed         HISTORY OF PRESENT ILLNESS:  Mr. Paramjit Jarquin is a 45 y.o. male returns for a follow up visit for multiple medical problems. His current presenting problems are   1. Cervical radiculopathy    2. Cervical spondylosis without myelopathy    3. DDD (degenerative disc disease), cervical    .    As per information/history obtained from the PADT(patient assessment and documentation tool) - He complains of pain in the neck with radiation to the shoulders Left, arms Left and hands Left He rates the pain 4/10 and describes it as aching, throbbing, numbness. Pain is made worse by: lack of support of the arm and neck. He denies side effects from the current pain regimen. Patient reports that since the last follow up visit the physical functioning is unchanged, family/social relationships are unchanged, mood is worse and sleep patterns are worse, and that the overall functioning is unchanged. Patient denies neurological bowel or bladder. Mr. Paramjit Jarquin presents for follow-up of ongoing neck and left arm pain. The patient continues to complain of radiating left arm pain to the upper trapezius and down the arm into the hand. This is also associated with numbness and tingling in the left arm and pain in the left jaw. The patient states this is worse with no support for his arm or his neck. He does feel better with a neck pillow and something to support his arm. The patient did do physical therapy at [facility] from 11/6/19 to 8/28/2019 for a total of 20 visits for the diagnosis of radiculopathy, cervicothoracic. The patient has continued to home exercise program prescribed by the physical therapist at [facility] 3 times a week for the last 6 months. I will prescribe a Medrol Dosepak and physical therapy to include dry needling in the meantime until we can obtain an MRI of the cervical spine. Associated signs and symptoms:   Neurogenic bowel or bladder symptoms:  no   Perceived weakness:  no   Difficulty walking:  no              Past Medical History:   Past Medical History:   Diagnosis Date    Enlarged prostate       Past Surgical History:     History reviewed. No pertinent surgical history.   Current Medications:     Current Outpatient Medications:     methylPREDNISolone (MEDROL, ONEIDA,) 4 MG tablet, Take by mouth., Disp: 1 kit, Rfl: 0    allopurinol (ZYLOPRIM) 100 MG tablet, TAKE 1 TABLET BY MOUTH TWICE A DAY, Disp: 180 tablet, Rfl: 1    cyclobenzaprine (FLEXERIL) 10 MG tablet, Take 1 tablet by mouth nightly as needed for Muscle spasms, Disp: 30 tablet, Rfl: 0    docusate sodium (COLACE) 100 MG capsule, Take 1 capsule by mouth daily as needed for Constipation, Disp: 30 capsule, Rfl: 0    metFORMIN (GLUCOPHAGE) 500 MG tablet, TAKE 1 TABLET BY MOUTH TWICE A DAY WITH MEALS, Disp: 180 tablet, Rfl: 1   Blood Glucose Monitoring Suppl KIT, 1 kit by Does not apply route daily Dispense according to insurance formulary, Disp: 1 kit, Rfl: 0    blood glucose test strips (ASCENSIA AUTODISC VI;ONE TOUCH ULTRA TEST VI) strip, 1 each by In Vitro route daily Test as directed,Dispense according to insurance formulary, Disp: 100 each, Rfl: 2    Lancets MISC, 1 each by Does not apply route daily Test as directed, Dispense according to insurance formulary, Disp: 100 each, Rfl: 2    latanoprost (XALATAN) 0.005 % ophthalmic solution, INSTILL ONE DROP IN BOTH EYES AT BEDTIME, Disp: , Rfl:     blood glucose monitor strips, Test 1 times a day & as needed for symptoms of irregular blood glucose. Dispense sufficient amount for indicated testing frequency plus additional to accommodate PRN testing needs. , Disp: 100 strip, Rfl: 1    Blood Glucose Monitoring Suppl (D-CARE GLUCOMETER) w/Device KIT, 1 applicator by Does not apply route daily, Disp: 30 kit, Rfl: 0    cetirizine (ZYRTEC) 10 MG tablet, Take 10 mg by mouth daily, Disp: , Rfl:     polyethyl glycol-propyl glycol 0.4-0.3 % (SYSTANE) 0.4-0.3 % ophthalmic solution, 1 drop as needed for Dry Eyes, Disp: , Rfl:     ibuprofen (ADVIL;MOTRIN) 600 MG tablet, Take 600 mg by mouth every 6 hours as needed for Pain, Disp: , Rfl:     diclofenac sodium (VOLTAREN) 1 % GEL, Apply 2 g topically 2 times daily, Disp: , Rfl:     albuterol sulfate HFA (VENTOLIN HFA) 108 (90 Base) MCG/ACT inhaler, Inhale 2 puffs into the lungs every 6 hours as needed for Wheezing, Disp: , Rfl:     fluticasone (FLONASE) 50 MCG/ACT nasal spray, 1 spray by Each Nostril route daily, Disp: , Rfl:     Hypertonic Nasal Wash (SINUS RINSE NA), by Nasal route, Disp: , Rfl:     atorvastatin (LIPITOR) 40 MG tablet, Take 1 tablet by mouth daily, Disp: 90 tablet, Rfl: 3    ibuprofen (ADVIL;MOTRIN) 600 MG tablet, Take 1 tablet by mouth every 8 hours as needed for Pain, Disp: 30 tablet, Rfl: 0 Allergies:  Patient has no known allergies. Social History:    reports that he has never smoked. He has never used smokeless tobacco. He reports previous alcohol use. He reports that he does not use drugs. Family History:   Family History   Problem Relation Age of Onset    Prostate Cancer Father        REVIEW OF SYSTEMS:   CONSTITUTIONAL: Denies unexplained weight loss, fevers, chills or fatigue  NEUROLOGICAL: Denies unsteady gait or progressive weakness  MUSCULOSKELETAL: Denies joint swelling or redness  GI: Denies nausea, vomiting, diarrhea   : Denies bowel or bladder issues       PHYSICAL EXAM:    Vitals: Temperature 97.7 °F (36.5 °C), resp. rate 12, height 6' (1.829 m), weight 206 lb (93.4 kg). GENERAL EXAM:  · General Apparence: Patient is adequately groomed with no evidence of malnutrition. · Psychiatric: Orientation: The patient is oriented to time, place and person. The patient's mood and affect are appropriate   · Vascular: Examination reveals no swelling and palpation reveals no tenderness in upper or lower extremities. Good capillary refill. · The lymphatic examination of the neck, axillae and groin reveals all areas to be without enlargement or induration  · Sensation is intact without deficit in the upper and lower extremities to light touch and pinprick  · Coordination of the upper and lower extremities are normal.    CERVICAL EXAMINATION:  · Inspection: Local inspection shows no step-off or bruising. Cervical alignment is normal. No instability is noted. · Palpation and Percussion: No evidence of tenderness at the midline. Paraspinal tenderness is not present. There is no paraspinal spasm. Skin:There are no rashes, ulcerations or lesions  · Range of Motion:  limited by 50% in all planes due to pain   · Strength: 4/5 left shoulder abduction  · Special Tests:   Spurling's positive left and Neely's are negative bilaterally. Tirado and Impingement tests are negative bilaterally. · Skin:There are no rashes, ulcerations or lesions in right & left upper extremities. · Reflexes: Bilaterally triceps, biceps and brachioradialis are 1+. Clonus absent bilaterally at the feet. No pathological reflexes are noted. · Gait & station: normal, patient ambulates without assistance  · Additional Examinations:  · RIGHT UPPER EXTREMITY:  Inspection/examination of the right upper extremity does not show any tenderness, deformity or injury. Range of motion is unremarkable and pain-free. There is no gross instability. There are no rashes, ulcerations or lesions. Strength and tone are normal. No atrophy or abnormal movements are noted. · LEFT UPPER EXTREMITY: Inspection/examination of the left upper extremity does not show any tenderness, deformity or injury. Range of motion is unremarkable and pain-free. There is no gross instability. There are no rashes, ulcerations or lesions. Strength and tone are normal. No atrophy or abnormal movements are noted. · RIGHT LOWER EXTREMITY: Inspection/examination of the right lower extremity does not show any tenderness, deformity or injury. Range of motion is normal and pain-free. There is no gross instability. There are no rashes, ulcerations or lesions. Strength and tone are normal. No atrophy or abnormal movements are noted. · LEFT LOWER EXTREMITY:  Inspection/examination of the left lower extremity does not show any tenderness, deformity or injury. Range of motion is normal and pain-free. There is no gross instability. There are no rashes, ulcerations or lesions. Strength and tone are normal. No atrophy or abnormal movements are noted.       Diagnostic Testing:    No new diagnostics  Results for orders placed or performed in visit on 10/12/20   POCT glycosylated hemoglobin (Hb A1C)   Result Value Ref Range    Hemoglobin A1C 6.5 %   POCT microalbumin   Result Value Ref Range    Microalb, Ur 30mg/L     Creatinine Ur POCT 200mg/dL Microalbumin Creatinine Ratio 30mg/g normal      Impression:       1. Cervical radiculopathy    2. Cervical spondylosis without myelopathy    3. DDD (degenerative disc disease), cervical        Plan:  Clinical Course: Above diagnoses are worsening    I discussed the diagnosis and the treatment options with Gray Love today. In Summary:  The various treatment options were outlined and discussed with Gray Love including:  Conservative care options: physical therapy, ice, medications, bracing, and activity modification. The indications for therapeutic injections. The indications for additional imaging/laboratory studies. The indications for (possible future) interventions. After considering the various options discussed, Gray Love elected to pursue a course of treatment that includes the followin. Medications:  I will prescribe a medrol dose pack to help with the inflammatory component of pain. Risks, benefits and alternatives were discussed. Recommended to stop any NSAIDs to reduce risk of GI bleed during the course of the dose pack. 2. PT:  I will start the patient on a trial of PT to work on a cervical stabilization program to focus on stretching, strengthening, traction and modalities as indicated. 3. Further studies:  I will obtain a Cervical MR without contrast to evaluate for soft tissue pathology or stenosis contributing to the neck pain and paresthesias. 4. Interventional:  After further imaging is obtained, interventional options will be reviewed and recommended.     5. Follow up:  2-3 weeks Yamil Arzola was instructed to call the office if his symptoms worsen or if new symptoms appear prior to the next scheduled visit. He is specifically instructed to contact the office between now & his scheduled appointment if he has concerns related to his condition or if he needs assistance in scheduling the above tests. He is welcome to call for an appointment sooner if he has any additional concerns or questions. Ghulam Lopez PA-C   Board Certified by the [employer] on Certification of Physician Assistants  [address] [facility]             This dictation was performed with a verbal recognition program [facility]) and it was checked for errors. It is possible that there are still dictated errors within this office note. If so, please bring any errors to my attention for an addendum. All efforts were made to ensure that this office note is accurate.

## 2021-01-12 ENCOUNTER — HOSPITAL ENCOUNTER (OUTPATIENT)
Dept: PHYSICAL THERAPY | Age: 51
Setting detail: THERAPIES SERIES
Discharge: HOME | End: 2021-01-12
Payer: COMMERCIAL

## 2021-01-12 PROCEDURE — 97112 NEUROMUSCULAR REEDUCATION: CPT

## 2021-01-12 PROCEDURE — 97140 MANUAL THERAPY 1/> REGIONS: CPT

## 2021-01-12 PROCEDURE — 97161 PT EVAL LOW COMPLEX 20 MIN: CPT

## 2021-01-12 NOTE — FLOWSHEET NOTE
[address], [employer]    Physical Therapy Treatment Note/ Progress Report:     Date:  2021    Patient Name:  Henrry Vazquez    :  1970  MRN: [id number]  Medical/Treatment Diagnosis Information:  · Diagnosis: M54.12 cervical radiculopathy; M47.812 cervical spondylosis without myelopathy; M50.30 DDD, cervical  · Treatment Diagnosis: C29.4  Insurance/Certification information:  PT Insurance Information: [employer]; 30 visits; / not covered  Physician Information:  Referring Practitioner: GIANLUCA Bowen  Plan of care signed (Y/N):     Date of Patient follow up with Physician:      Progress Report: [x]  Yes  []  No     Functional Scale: NDI: 40%   Date: 2021    Date Range for reporting period:  Beginnin2021  Ending:      Progress report due (10 Rx/or 30 days whichever is less): 6863     Recertification due (POC duration/ or 90 days whichever is less): 2021     Visit # Insurance Allowable Auth Needed    not covered []Yes    [x]No     Pain level:  5/10     SUBJECTIVE:  See eval    OBJECTIVE: See eval  ? Observation:   ? Test measurements:      RESTRICTIONS/PRECAUTIONS: n/a    Exercises/Interventions:   Therapeutic Ex Wt / Resistance Sets/sec Reps Notes                                                                                                        Therapeutic Activities                                                                      Manual Intervention- 15       Shld /GH Mobs       Post Cap mobs       Thoracic/Rib manipulation 2'   Multilevel supine AP thrust   CT MT/Mobs 5'   Prone CT distraction mobilization   PROM MT       Cervical mobilization 5'   Prone C5-7   Prone thoracic mobilization 3'   T1-T4   NMR re-education- 15       Open books  1 10 bilat   Thoracic extension  1 10 Over towel roll                                                 Therapeutic Exercise and NMR EXR [x] (57902) Provided verbal/tactile cueing for activities related to strengthening, flexibility, endurance, ROM  for improvements in scapular, scapulothoracic and UE control with self care, reaching, carrying, lifting, house/yardwork, driving/computer work. [x] (32604) Provided verbal/tactile cueing for activities related to improving balance, coordination, kinesthetic sense, posture, motor skill, proprioception  to assist with  scapular, scapulothoracic and UE control with self care, reaching, carrying, lifting, house/yardwork, driving/computer work. Therapeutic Activities:    [] (73899 or 97324) Provided verbal/tactile cueing for activities related to improving balance, coordination, kinesthetic sense, posture, motor skill, proprioception and motor activation to allow for proper function of scapular, scapulothoracic and UE control with self care, carrying, lifting, driving/computer work.      Home Exercise Program:    [x] (58103) Reviewed/Progressed HEP activities related to strengthening, flexibility, endurance, ROM of scapular, scapulothoracic and UE control with self care, reaching, carrying, lifting, house/yardwork, driving/computer work  [] (34319) Reviewed/Progressed HEP activities related to improving balance, coordination, kinesthetic sense, posture, motor skill, proprioception of scapular, scapulothoracic and UE control with self care, reaching, carrying, lifting, house/yardwork, driving/computer work      Manual Treatments:  PROM / STM / Oscillations-Mobs:  G-I, II, III, IV (PA's, Inf., Post.) [x] (71432) Provided manual therapy to mobilize soft tissue/joints of cervical/CT, scapular GHJ and UE for the purpose of modulating pain, promoting relaxation,  increasing ROM, reducing/eliminating soft tissue swelling/inflammation/restriction, improving soft tissue extensibility and allowing for proper ROM for normal function with self care, reaching, carrying, lifting, house/yardwork, driving/computer work    Modalities:      Charges:  Timed Code Treatment Minutes: 25   Total Treatment Minutes: 45       [x] EVAL (LOW) 26842 (typically 20 minutes face-to-face)  [] EVAL (MOD) 65192 (typically 30 minutes face-to-face)  [] EVAL (HIGH) 53094 (typically 45 minutes face-to-face)  [] RE-EVAL     [] MM(02536) x     [] IONTO (37795)  [x] NMR (04867) x 1    [] VASO (94634)  [x] Manual (19026) x 1    [] Other:  [] TA (17959)x     [] Mech Traction (44393)  [] ES(attended) (90275)     [] ES (un) (29451):         GOALS:  Patient stated goal: \"get rid of pain and be able to sleep better\"  [] Progressing: [] Met: [x] Not Met: [] Adjusted    Therapist goals for Patient:   Short Term Goals: To be achieved in: 2 weeks  1. Independent in HEP and progression per patient tolerance, in order to prevent re-injury. [] Progressing: [] Met: [x] Not Met: [] Adjusted    2. Patient will have a decrease in pain to facilitate improvement in movement, function, and ADLs as indicated by Functional Deficits. [] Progressing: [] Met: [x] Not Met: [] Adjusted    Long Term Goals: To be achieved in: 4 weeks  1. Disability index score of 10% or less for the NDI to assist with reaching prior level of function. [] Progressing: [] Met: [x] Not Met: [] Adjusted  2. Patient will demonstrate increased AROM to [facility] of cervical/thoracic spine to allow for proper joint functioning as indicated by patients Functional Deficits.    [] Progressing: [] Met: [x] Not Met: [] Adjusted 3. Patient will demonstrate an increase in postural awareness and control and activation of  Deep cervical stabilizers to allow for proper functional mobility as indicated by patients Functional Deficits. [] Progressing: [] Met: [x] Not Met: [] Adjusted  4. Patient will return to daily functional activities without increased symptoms or restriction. [] Progressing: [] Met: [x] Not Met: [] Adjusted   5. Pt will be able to sleep through the night with increased symptoms or restrictions. [] Progressing: [] Met: [x] Not Met: [] Adjusted      ASSESSMENT:  See eval      Treatment/Activity Tolerance:  [x] Patient tolerated treatment well [] Patient limited by fatique  [] Patient limited by pain  [] Patient limited by other medical complications  [] Other:     Overall Progression Towards Functional goals/ Treatment Progress Update:  [] Patient is progressing as expected towards functional goals listed. [] Progression is slowed due to complexities/Impairments listed. [] Progression has been slowed due to co-morbidities. [x] Plan just implemented, too soon to assess goals progression <30days   [] Goals require adjustment due to lack of progress  [] Patient is not progressing as expected and requires additional follow up with physician  [] Other    Prognosis for POC: [x] Good [] Fair  [] Poor    Patient requires continued skilled intervention: [x] Yes  [] No      PLAN: See eval  [] Continue per plan of care [] Alter current plan (see comments)  [x] Plan of care initiated [] Hold pending MD visit [] Discharge    Electronically signed by: Liya Ramsey, PT     Note: If patient does not return for scheduled/recommended follow up visits, this note will serve as a discharge from care along with the most recent update on progress.

## 2021-01-12 NOTE — PLAN OF CARE
[facility] [address]  Phone [phone]   Fax [phone]                                                       Physical Therapy Certification    Dear Referring Practitioner: GIANLUCA Pike,    We had the pleasure of evaluating the following patient for physical therapy services at [address] [place] [address]. A summary of our findings can be found in the initial assessment below. This includes our plan of care. If you have any questions or concerns regarding these findings, please do not hesitate to contact me at the office phone number checked above. Thank you for the referral.       Physician Signature:_______________________________Date:__________________  By signing above (or electronic signature), therapists plan is approved by physician      Patient: Miguel Angel Montenegro   : 1970   MRN: [id number]  Referring Physician: Referring Practitioner: GIANLUCA Pike      Evaluation Date: 2021      Medical Diagnosis Information:  Diagnosis: M54.12 cervical radiculopathy; M47.812 cervical spondylosis without myelopathy; M50.30 DDD, cervical   Treatment Diagnosis: M54.2                                         Insurance information:  Insurance Information: [facility]; 30 visits; [id number] not covered    Precautions/ Contra-indications: n/a    C-SSRS Triggered by Intake questionnaire (Past 2 wk assessment):   [] No, Questionnaire did not trigger screening.    [x] Yes, Patient intake triggered further evaluation      [x] C-SSRS Screening completed- denies SI/HI; no to all red flag questions; states feelings stem from how long he has been dealing with his pain and life stressors related to family and COVID  [] PCP notified via Plan of Care  [] Emergency services notified     Latex Allergy:  [x]NO      []YES  Preferred Language for Healthcare:   [x]English       []Other: SUBJECTIVE: Patient stated complaint:  Pt presents for evaluation of neck and left arm pain. Pt states symptoms started 1-2 years ago without traumatic incident. Pt states pain started in the neck, described as dull ache and sore and slowly started going to his shoulder, arm, and hand over time. States pain radiates down to his left elbow and has N/T down into his hand that he feels like he needs to shake out. Does get sharp pain w/ quick head and L arm movements. Denies dropping items. Pt is RHD.      Relevant Medical History: anxiety, depression, DM II, HTN  Functional Disability Index: NDI: 40%    Pain Scale: 5/10  Easing factors: laying on left side, diclofenac, steroids, putting L hand on head  Provocative factors: sleeping, repetitive lifting w/ LUE, turning head too fast     Type: []Constant   [x]Intermittent  [x]Radiating []Localized []other:     Numbness/Tingling: endorses N/T in upper L arm and L hand;     Occupation/School:      Living Status/Prior Level of Function: Independent with ADLs and IADLs    OBJECTIVE:     CERV ROM      Cervical Flexion     Cervical Extension 30     Left Right   Cervical SB     Cervical rotation 40 70   Quadrant     Dorsal Glide      UE ROM  Left Right   Shoulder Flex     Shoulder Abd     Shoulder ER     Shoulder IR     Elbow flex/ext     Wrist flex/ext/pro/sup     Finger flex/ext/opposition     Shoulder AROM WNL w OP X X   UE Strength  Left Right   Shoulder Flex 5 5   Shoulder Scap 4+ 5   Shoulder ABD (C5) 5 5   Shoulder ER  5 5   Shoulder IR 5 5   Elbow Flex (C5) 5 5   Elbow Ext (C7 Radial) 4+ 5   Wrist Flex (C6 Radial) 4+ 5   Wrist Ext (C7 Radial) 4+ 5   Finger flex (C8 median) 5 5   Finger ext (C7 Radial-PIN) 5 5   APB (T1 Median) 5 5   Finger Abd (T1 Ulnar) 5 5   UE myotomes WNL        Reflexes Normal Abnormal Comments   S1-2 Seated achilles [x] []    S1-2 Prone knee bend [x] []    L3-4 Patellar tendon [] []    C5-6 Biceps [x] []    C6 Brachioradialis [x] [] C7-8 Triceps [x] []      Balance:     Joint mobility: C6-T4   []Normal    [x]Hypo   []Hyper    Palpation: C6 and C7 dermatomes exhibit limited light touch as compared to right. Functional Mobility/Transfers: WFL    Posture: forward head, rounded shoulder, increased upper thoracic kyphosis    Bandages/Dressings/Incisions: N/A    Gait: (include devices/WB status): WNL    Neurodynamics: + ULNT    Orthopedic Special Tests:      Screening Testing  Normal Abnormal N/A Comments   Babinski Test [x] [] []    Neely Test [x] [] []    Inverted Sup Sign [x] [] []    Alar Ligament Test (spinous kick) [x] [] []    Transverse Ligament Test [x] [] []    Sharp-Lexie Test [x] [] []    Hautards Test [x] [] []    Vertebral Artery Test [x] [] []             Orthopedic Special Tests Normal Abnormal N/A Comments   Spurling Maneuver: * [] [x] []    Distraction testing:* [] [x] []    ULTT* [] [x] []    Rotation < 60 deg involved side* [] [x] []    Shoulder Abd test [] [x] []    Cerv Rot/Lat Flex- 1st Rib [x] [] []    Deep Neck Flex/endurance testing [x] [] []    Craniocerv Flex testing /Cuff [x] [] []    End Range Tolerance testing. [x] [] []     [] [] []    *clinical cluster for cervical radiculopathy          [x] Patient history, allergies, meds reviewed. Medical chart reviewed. See intake form. Review Of Systems (ROS):  [x]Performed Review of systems (Integumentary, CardioPulmonary, Neurological) by intake and observation. Intake form has been scanned into medical record. Patient has been instructed to contact their primary care physician regarding ROS issues if not already being addressed at this time.       Co-morbidities/Complexities (which will affect course of rehabilitation):   []None           Arthritic conditions   []Rheumatoid arthritis (M05.9)  []Osteoarthritis (M19.91)   Cardiovascular conditions   [x]Hypertension (I10)  []Hyperlipidemia (E78.5)  []Angina pectoris (I20)  []Atherosclerosis (I70) Musculoskeletal conditions   []Disc pathology   []Congenital spine pathologies   []Prior surgical intervention  []Osteoporosis (M81.8)  []Osteopenia (M85.8)   Endocrine conditions   []Hypothyroid (E03.9)  []Hyperthyroid Gastrointestinal conditions   []Constipation (J03.36)   Metabolic conditions   []Morbid obesity (E66.01)  [x]Diabetes type 1(E10.65) or 2 (E11.65)   []Neuropathy (G60.9)     Pulmonary conditions   []Asthma (J45)  []Coughing   []COPD (J44.9)   Psychological Disorders  [x]Anxiety (F41.9)  [x]Depression (F32.9)   []Other:   []Other:          Barriers to/and or personal factors that will affect rehab potential:              []Age  []Sex              []Motivation/Lack of Motivation                        []Co-Morbidities              []Cognitive Function, education/learning barriers              []Environmental, home barriers              []profession/work barriers  []past PT/medical experience  []other:  Justification:     Falls Risk Assessment (30 days):   [x] Falls Risk assessed and no intervention required. [] Falls Risk assessed and Patient requires intervention due to being higher risk   TUG score (>12s at risk):     [] Falls education provided, including       ASSESSMENT: Pt reported decreased symptoms and relief of N/T in hand w/ mobilizations to C7 and CT junction, decreased further following supine thoracic AP manipulation. General limited spinal mobility contributing to increased nerve root irritation C6/C7 consistent w/ abnormal strength and dermatome testing.    Functional Impairments:     [x]Noted cervical/thoracic/GHJ joint hypomobility   []Noted cervical/thoracic/GHJ joint hypermobility   [x]Decreased cervical/UE functional ROM   []Noted Headache pain aggravated by neck movements with/without dizziness   [x]Abnormal reflexes/sensation/myotomal/dermatomal deficits   [x]Decreased DCF control or ability to hold head up   [x]Decreased RC/scapular/core strength and neuromuscular control [x]Decreased UE functional strength   []other:      Functional Activity Limitations (from functional questionnaire and intake)   [x]Reduced ability to tolerate prolonged functional positions   []Reduced ability or difficulty with changes of positions or transfers between positions   [x]Reduced ability to maintain good posture and demonstrate good body mechanics with sitting, bending, and lifting   [x] Reduced ability or tolerance with driving and/or computer work   [x]Reduced ability to perform lifting, reaching, carrying tasks   []Reduced ability to concentrate   [x]Reduced ability to sleep    [x]Reduced ability to tolerate any impact through UE or spine   []Reduced ability to ambulate prolonged functional periods/distances   []other:    Participation Restrictions   [x]Reduced participation in self care activities   [x]Reduced participation in home management activities   []Reduced participation in work activities   []Reduced participation in social activities. []Reduced participation in sport/recreational activities.     Classification/Subgrouping:   [x]signs/symptoms consistent with neck pain with mobility deficits     []signs/symptoms consistent with neck pain with movement coordinated impairments    []signs/symptoms consistent with neck pain with radiating pain    []signs/symptoms consistent with neck pain with headaches (cervicogenic)    [x]Signs/symptoms consistent with nerve root involvement including myotome & dermatome dysfunction   []sign/symptoms consistent with facet dysfunction of cervical and thoracic spine    []signs/symptoms consistent suggesting central cord compression/UMN syndromes   []signs/symptoms consistent with discogenic cervical pain   []signs/symptoms consistent with rib dysfunction   []signs/symptoms consistent with postural dysfunction   []signs/symptoms consistent with shoulder pathology [x]  Therapeutic exercise including: strength training, ROM, for cervical spine,scapula, core and Upper extremity, including postural re-education. [x]  NMR activation and proprioception for Deep cervical flexors, periscapular and RC muscles and Core, including postural re-education. [x]  Manual therapy as indicated for C/T spine, ribs, Soft tissue to include: Dry Needling/IASTM, STM, PROM, Gr I-IV mobilizations, manipulation. [x] Modalities as needed that may include: thermal agents, E-stim, Biofeedback, US, iontophoresis as indicated  [x] Patient education on joint protection, postural re-education, activity modification, progression of HEP. HEP instruction: (see scanned forms)    GOALS:  Patient stated goal: \"get rid of pain and be able to sleep better\"  [] Progressing: [] Met: [x] Not Met: [] Adjusted    Therapist goals for Patient:   Short Term Goals: To be achieved in: 2 weeks  1. Independent in HEP and progression per patient tolerance, in order to prevent re-injury. [] Progressing: [] Met: [x] Not Met: [] Adjusted    2. Patient will have a decrease in pain to facilitate improvement in movement, function, and ADLs as indicated by Functional Deficits. [] Progressing: [] Met: [x] Not Met: [] Adjusted    Long Term Goals: To be achieved in: 4 weeks  1. Disability index score of 10% or less for the NDI to assist with reaching prior level of function. [] Progressing: [] Met: [x] Not Met: [] Adjusted  2. Patient will demonstrate increased AROM to [facility] of cervical/thoracic spine to allow for proper joint functioning as indicated by patients Functional Deficits. [] Progressing: [] Met: [x] Not Met: [] Adjusted  3. Patient will demonstrate an increase in postural awareness and control and activation of  Deep cervical stabilizers to allow for proper functional mobility as indicated by patients Functional Deficits.    [] Progressing: [] Met: [x] Not Met: [] Adjusted 4. Patient will return to daily functional activities without increased symptoms or restriction. [] Progressing: [] Met: [x] Not Met: [] Adjusted   5. Pt will be able to sleep through the night with increased symptoms or restrictions.    [] Progressing: [] Met: [x] Not Met: [] Adjusted       Electronically signed by:  Robel Angeles PT

## 2021-01-13 ENCOUNTER — TELEPHONE (OUTPATIENT)
Dept: ORTHOPEDIC SURGERY | Age: 51
End: 2021-01-13

## 2021-01-13 NOTE — TELEPHONE ENCOUNTER
S/W PATIENT regarding MRI CSP approval and authorization being valid until 3/9/2021. Patient was instructed to call [facility] to schedule MRI CSP, then contact our office for follow up appointment. MRI CSP results will not be given over the phone or via [employer]. Patient was also asked to allow a minimum 48 hours for follow up appointment from MRI scan in order for staff to obtain MRI report. Patient currently does not have a follow up scheduled. He was asked to schedule this once he schedules his MRI.

## 2021-01-15 ENCOUNTER — HOSPITAL ENCOUNTER (OUTPATIENT)
Dept: PHYSICAL THERAPY | Age: 51
Setting detail: THERAPIES SERIES
Discharge: HOME | End: 2021-01-15
Payer: COMMERCIAL

## 2021-01-15 PROCEDURE — 97110 THERAPEUTIC EXERCISES: CPT

## 2021-01-15 PROCEDURE — 97140 MANUAL THERAPY 1/> REGIONS: CPT

## 2021-01-15 NOTE — FLOWSHEET NOTE
Thoracic extension  1 10 Over towel roll   Supine cervical retraction  5'' 10    TB rows red 2 10    TB ext red 2 10                             Therapeutic Exercise and NMR EXR  [x] (03395) Provided verbal/tactile cueing for activities related to strengthening, flexibility, endurance, ROM  for improvements in scapular, scapulothoracic and UE control with self care, reaching, carrying, lifting, house/yardwork, driving/computer work. [x] (06301) Provided verbal/tactile cueing for activities related to improving balance, coordination, kinesthetic sense, posture, motor skill, proprioception  to assist with  scapular, scapulothoracic and UE control with self care, reaching, carrying, lifting, house/yardwork, driving/computer work. Therapeutic Activities:    [] (26589 or 74800) Provided verbal/tactile cueing for activities related to improving balance, coordination, kinesthetic sense, posture, motor skill, proprioception and motor activation to allow for proper function of scapular, scapulothoracic and UE control with self care, carrying, lifting, driving/computer work.      Home Exercise Program:    [x] (01492) Reviewed/Progressed HEP activities related to strengthening, flexibility, endurance, ROM of scapular, scapulothoracic and UE control with self care, reaching, carrying, lifting, house/yardwork, driving/computer work  [] (16607) Reviewed/Progressed HEP activities related to improving balance, coordination, kinesthetic sense, posture, motor skill, proprioception of scapular, scapulothoracic and UE control with self care, reaching, carrying, lifting, house/yardwork, driving/computer work      Manual Treatments:  PROM / STM / Oscillations-Mobs:  G-I, II, III, IV (PA's, Inf., Post.) [x] (00719) Provided manual therapy to mobilize soft tissue/joints of cervical/CT, scapular GHJ and UE for the purpose of modulating pain, promoting relaxation,  increasing ROM, reducing/eliminating soft tissue swelling/inflammation/restriction, improving soft tissue extensibility and allowing for proper ROM for normal function with self care, reaching, carrying, lifting, house/yardwork, driving/computer work    Modalities:      Charges:  Timed Code Treatment Minutes: 40   Total Treatment Minutes: 40       [] EVAL (LOW) 28936 (typically 20 minutes face-to-face)  [] EVAL (MOD) 23644 (typically 30 minutes face-to-face)  [] EVAL (HIGH) 94209 (typically 45 minutes face-to-face)  [] RE-EVAL     [x] PY(50340) x 1    [] IONTO (18493)  [] NMR (26477) x     [] VASO (60721)  [x] Manual (19439) x 2    [] Other:  [] TA (69439)x     [] Mech Traction (60261)  [] ES(attended) (90405)     [] ES (un) (92301):         GOALS:  Patient stated goal: \"get rid of pain and be able to sleep better\"  [] Progressing: [] Met: [x] Not Met: [] Adjusted    Therapist goals for Patient:   Short Term Goals: To be achieved in: 2 weeks  1. Independent in HEP and progression per patient tolerance, in order to prevent re-injury. [] Progressing: [] Met: [x] Not Met: [] Adjusted    2. Patient will have a decrease in pain to facilitate improvement in movement, function, and ADLs as indicated by Functional Deficits. [] Progressing: [] Met: [x] Not Met: [] Adjusted    Long Term Goals: To be achieved in: 4 weeks  1. Disability index score of 10% or less for the NDI to assist with reaching prior level of function. [] Progressing: [] Met: [x] Not Met: [] Adjusted  2. Patient will demonstrate increased AROM to [facility] of cervical/thoracic spine to allow for proper joint functioning as indicated by patients Functional Deficits.    [] Progressing: [] Met: [x] Not Met: [] Adjusted [x] Plan of care initiated [] Hold pending MD visit [] Discharge    Electronically signed by: Saadia Boyd PT     Note: If patient does not return for scheduled/recommended follow up visits, this note will serve as a discharge from care along with the most recent update on progress.

## 2021-01-19 ENCOUNTER — HOSPITAL ENCOUNTER (OUTPATIENT)
Dept: PHYSICAL THERAPY | Age: 51
Setting detail: THERAPIES SERIES
Discharge: HOME | End: 2021-01-19
Payer: COMMERCIAL

## 2021-01-21 ENCOUNTER — HOSPITAL ENCOUNTER (OUTPATIENT)
Dept: MRI IMAGING | Age: 51
Discharge: HOME | End: 2021-01-21
Payer: COMMERCIAL

## 2021-01-21 DIAGNOSIS — M50.30 DDD (DEGENERATIVE DISC DISEASE), CERVICAL: ICD-10-CM

## 2021-01-21 DIAGNOSIS — M54.12 CERVICAL RADICULOPATHY: ICD-10-CM

## 2021-01-21 DIAGNOSIS — M47.812 CERVICAL SPONDYLOSIS WITHOUT MYELOPATHY: ICD-10-CM

## 2021-01-21 PROCEDURE — 72141 MRI NECK SPINE W/O DYE: CPT

## 2021-01-22 ENCOUNTER — APPOINTMENT (OUTPATIENT)
Dept: PHYSICAL THERAPY | Age: 51
End: 2021-01-22
Payer: COMMERCIAL

## 2021-01-22 ENCOUNTER — TELEPHONE (OUTPATIENT)
Dept: ORTHOPEDIC SURGERY | Age: 51
End: 2021-01-22

## 2021-01-26 ENCOUNTER — HOSPITAL ENCOUNTER (OUTPATIENT)
Dept: PHYSICAL THERAPY | Age: 51
Setting detail: THERAPIES SERIES
Discharge: HOME | End: 2021-01-26
Payer: COMMERCIAL

## 2021-01-26 NOTE — FLOWSHEET NOTE
[facility] [employer]    Physical Therapy  Cancellation/No-show Note  Patient Name:  Erik Mullins  :  1970   Date:  2021  Cancelled visits to date: 1  No-shows to date: 1    For today's appointment patient:  []  Cancelled  []  Rescheduled appointment  [x]  No-show     Reason given by patient:  []  Patient ill  []  Conflicting appointment  []  No transportation    []  Conflict with work  []  No reason given  [x]  Other:     Comments:  Pt daughter came home from school sick today, they have to quarantine for 1 week    Phone call information:   []  Phone call made today to patient at _ time at number provided:      []  Patient answered, conversation as follows:    []  Patient did not answer, message left as follows:  []  Phone call not made today  [x]  Phone call not needed - pt contacted us to cancel and provided reason for cancellation.      Electronically signed by:  Abiodun Chau PT

## 2021-01-29 ENCOUNTER — HOSPITAL ENCOUNTER (OUTPATIENT)
Dept: PHYSICAL THERAPY | Age: 51
Setting detail: THERAPIES SERIES
Discharge: HOME | End: 2021-01-29
Payer: COMMERCIAL

## 2021-01-29 ENCOUNTER — OFFICE VISIT (OUTPATIENT)
Dept: ORTHOPEDIC SURGERY | Age: 51
End: 2021-01-29
Payer: COMMERCIAL

## 2021-01-29 VITALS — RESPIRATION RATE: 12 BRPM | TEMPERATURE: 96.5 F | BODY MASS INDEX: 27.9 KG/M2 | HEIGHT: 72 IN | WEIGHT: 206 LBS

## 2021-01-29 DIAGNOSIS — M54.12 CERVICAL RADICULOPATHY: Primary | ICD-10-CM

## 2021-01-29 DIAGNOSIS — M50.30 DDD (DEGENERATIVE DISC DISEASE), CERVICAL: ICD-10-CM

## 2021-01-29 DIAGNOSIS — M47.812 CERVICAL SPONDYLOSIS WITHOUT MYELOPATHY: ICD-10-CM

## 2021-01-29 PROCEDURE — G8427 DOCREV CUR MEDS BY ELIG CLIN: HCPCS | Performed by: PHYSICAL MEDICINE & REHABILITATION

## 2021-01-29 PROCEDURE — 1036F TOBACCO NON-USER: CPT | Performed by: PHYSICAL MEDICINE & REHABILITATION

## 2021-01-29 PROCEDURE — G8417 CALC BMI ABV UP PARAM F/U: HCPCS | Performed by: PHYSICAL MEDICINE & REHABILITATION

## 2021-01-29 PROCEDURE — 99213 OFFICE O/P EST LOW 20 MIN: CPT | Performed by: PHYSICAL MEDICINE & REHABILITATION

## 2021-01-29 PROCEDURE — 3017F COLORECTAL CA SCREEN DOC REV: CPT | Performed by: PHYSICAL MEDICINE & REHABILITATION

## 2021-01-29 PROCEDURE — 97112 NEUROMUSCULAR REEDUCATION: CPT

## 2021-01-29 PROCEDURE — 97140 MANUAL THERAPY 1/> REGIONS: CPT

## 2021-01-29 PROCEDURE — G8482 FLU IMMUNIZE ORDER/ADMIN: HCPCS | Performed by: PHYSICAL MEDICINE & REHABILITATION

## 2021-01-29 RX ORDER — DOCUSATE SODIUM 100 MG/1
100 CAPSULE, LIQUID FILLED ORAL DAILY PRN
Qty: 30 CAPSULE | Refills: 0 | Status: SHIPPED | OUTPATIENT
Start: 2021-01-29 | End: 2021-02-02

## 2021-01-29 RX ORDER — TIZANIDINE HYDROCHLORIDE 4 MG/1
4 CAPSULE, GELATIN COATED ORAL EVERY EVENING
Qty: 30 CAPSULE | Refills: 0 | Status: SHIPPED | OUTPATIENT
Start: 2021-01-29 | End: 2021-02-03 | Stop reason: ALTCHOICE

## 2021-01-29 NOTE — PROGRESS NOTES
Follow up: Fernando Nicolas  1970  [id number]         Chief Complaint   Patient presents with    Neck Pain     TR MRI CSP         HISTORY OF PRESENT ILLNESS:  Mr. Jesús Washington is a 45 y.o. male returns for a follow up visit for multiple medical problems. His current presenting problems are   1. Cervical radiculopathy    2. Cervical spondylosis without myelopathy    3. DDD (degenerative disc disease), cervical    .    As per information/history obtained from the PADT(patient assessment and documentation tool) - He complains of pain in the neck with radiation to the shoulders Left He rates the pain 4/10 and describes it as aching, throbbing. Pain is made worse by: movement. He denies side effects from the current pain regimen. Patient reports that since the last follow up visit the physical functioning is unchanged, family/social relationships are unchanged, mood is worse and sleep patterns are worse, and that the overall functioning is unchanged. Patient denies neurological bowel or bladder. He presents with neck pain and paresthesia into the left arm. Associated signs and symptoms:   Neurogenic bowel or bladder symptoms:  no   Perceived weakness:  no   Difficulty walking:  no            Past medical, surgical, social and family history reviewed with the patient. No pertinent relevant history  Past Medical History:   Past Medical History:   Diagnosis Date    Enlarged prostate       Past Surgical History:     History reviewed. No pertinent surgical history.   Current Medications:     Current Outpatient Medications:     allopurinol (ZYLOPRIM) 100 MG tablet, TAKE 1 TABLET BY MOUTH TWICE A DAY, Disp: 180 tablet, Rfl: 1    metFORMIN (GLUCOPHAGE) 500 MG tablet, TAKE 1 TABLET BY MOUTH TWICE A DAY WITH MEALS, Disp: 180 tablet, Rfl: 1    Blood Glucose Monitoring Suppl KIT, 1 kit by Does not apply route daily Dispense according to insurance formulary, Disp: 1 kit, Rfl: 0   blood glucose test strips (ASCENSIA AUTODISC VI;ONE TOUCH ULTRA TEST VI) strip, 1 each by In Vitro route daily Test as directed,Dispense according to insurance formulary, Disp: 100 each, Rfl: 2    Lancets MISC, 1 each by Does not apply route daily Test as directed, Dispense according to insurance formulary, Disp: 100 each, Rfl: 2    latanoprost (XALATAN) 0.005 % ophthalmic solution, INSTILL ONE DROP IN BOTH EYES AT BEDTIME, Disp: , Rfl:     blood glucose monitor strips, Test 1 times a day & as needed for symptoms of irregular blood glucose. Dispense sufficient amount for indicated testing frequency plus additional to accommodate PRN testing needs. , Disp: 100 strip, Rfl: 1    Blood Glucose Monitoring Suppl (D-CARE GLUCOMETER) w/Device KIT, 1 applicator by Does not apply route daily, Disp: 30 kit, Rfl: 0    cetirizine (ZYRTEC) 10 MG tablet, Take 10 mg by mouth daily, Disp: , Rfl:     polyethyl glycol-propyl glycol 0.4-0.3 % (SYSTANE) 0.4-0.3 % ophthalmic solution, 1 drop as needed for Dry Eyes, Disp: , Rfl:     ibuprofen (ADVIL;MOTRIN) 600 MG tablet, Take 600 mg by mouth every 6 hours as needed for Pain, Disp: , Rfl:     diclofenac sodium (VOLTAREN) 1 % GEL, Apply 2 g topically 2 times daily, Disp: , Rfl:     albuterol sulfate HFA (VENTOLIN HFA) 108 (90 Base) MCG/ACT inhaler, Inhale 2 puffs into the lungs every 6 hours as needed for Wheezing, Disp: , Rfl:     fluticasone (FLONASE) 50 MCG/ACT nasal spray, 1 spray by Each Nostril route daily, Disp: , Rfl:     Hypertonic Nasal Wash (SINUS RINSE NA), by Nasal route, Disp: , Rfl:     atorvastatin (LIPITOR) 40 MG tablet, Take 1 tablet by mouth daily, Disp: 90 tablet, Rfl: 3    ibuprofen (ADVIL;MOTRIN) 600 MG tablet, Take 1 tablet by mouth every 8 hours as needed for Pain, Disp: 30 tablet, Rfl: 0  Allergies:  Patient has no known allergies.   Social History: reports that he has never smoked. He has never used smokeless tobacco. He reports previous alcohol use. He reports that he does not use drugs. Family History:   Family History   Problem Relation Age of Onset    Prostate Cancer Father        REVIEW OF SYSTEMS:   CONSTITUTIONAL: Denies unexplained weight loss, fevers, chills or fatigue  NEUROLOGICAL: Denies unsteady gait or progressive weakness  MUSCULOSKELETAL: Denies joint swelling or redness  GI: Denies nausea, vomiting, diarrhea   : Denies bowel or bladder issues       PHYSICAL EXAM:    Vitals: Temperature 96.5 °F (35.8 °C), resp. rate 12, height 6' (1.829 m), weight 206 lb (93.4 kg). GENERAL EXAM:  · General Apparence: Patient is adequately groomed with no evidence of malnutrition. · Psychiatric: Orientation: The patient is oriented to time, place and person. The patient's mood and affect are appropriate   · Vascular: Examination reveals no swelling and palpation reveals no tenderness in upper or lower extremities. Good capillary refill. · Sensation is intact without deficit in the upper and lower extremities to light touch and pinprick  · Coordination of the upper and lower extremities are normal.    CERVICAL EXAMINATION:  · Inspection: Local inspection shows no step-off or bruising. Cervical alignment is normal. No instability is noted. · Palpation and Percussion: No evidence of tenderness at the midline. Paraspinal tenderness is present. There is no paraspinal spasm. Skin:There are no rashes, ulcerations or lesions  · Range of Motion:  limited by 25% in all planes due to pain   · Strength: 5/5 bilateral upper extremities  · Special Tests:   Spurling's+ on left   T[place]ado and Impingement tests are negative bilaterally. · Skin:There are no rashes, ulcerations or lesions in right & left upper extremities. · Reflexes: Bilaterally triceps, biceps and brachioradialis are 2+. Clonus absent bilaterally at the feet. No pathological reflexes are noted. · Gait & station: normal, patient ambulates without assistance and no ataxia  · Additional Examinations:  · RIGHT UPPER EXTREMITY:  Inspection/examination of the right upper extremity does not show any tenderness, deformity or injury. Range of motion is unremarkable and pain-free. There is no gross instability. There are no rashes, ulcerations or lesions. Strength and tone are normal. No atrophy or abnormal movements are noted. · LEFT UPPER EXTREMITY: Inspection/examination of the left upper extremity does not show any tenderness, deformity or injury. Range of motion is unremarkable and pain-free. There is no gross instability. There are no rashes, ulcerations or lesions. Strength and tone are normal. No atrophy or abnormal movements are noted. · Additional Examinations:  · RIGHT LOWER EXTREMITY: Inspection/examination of the right lower extremity does not show any tenderness, deformity or injury. Range of motion is normal and pain-free. There is no gross instability. There are no rashes, ulcerations or lesions. Strength and tone are normal. No atrophy or abnormal movements are noted. · LEFT LOWER EXTREMITY:  Inspection/examination of the left lower extremity does not show any tenderness, deformity or injury. Range of motion is normal and pain-free. There is no gross instability. There are no rashes, ulcerations or lesions. Strength and tone are normal. No atrophy or abnormal movements are noted. Diagnostic Testing:    MR cervical spine shows   Disc and osteophytes result in moderate narrowing of the neural foramina   bilaterally at C5-6.       Mild narrowing of the neural foramina at C3-4 and C4-5.       No stenosis of the thecal sac in the cervical region.        Results for orders placed or performed in visit on 10/12/20 POCT glycosylated hemoglobin (Hb A1C)   Result Value Ref Range    Hemoglobin A1C 6.5 %   POCT microalbumin   Result Value Ref Range    Microalb, Ur 30mg/L     Creatinine Ur POCT 200mg/dL     Microalbumin Creatinine Ratio 30mg/g normal      Impression:       1. Cervical radiculopathy    2. Cervical spondylosis without myelopathy    3. DDD (degenerative disc disease), cervical        Plan:  Clinical Course: Above diagnoses are worsening    I discussed the diagnosis and the treatment options with Farhad Bernabe today. In Summary:  The various treatment options were outlined and discussed with Farhad Bernabe including:  Conservative care options: physical therapy, ice, medications, bracing, and activity modification. The indications for therapeutic injections. The indications for additional imaging/laboratory studies. The indications for (possible future) interventions. After considering the various options discussed, Farhad Bernabe elected to pursue a course of treatment that includes the followin. Medications:  I will add a Zanaflex 4 mg nightly to the current regimen. Counseled on risks, benefits and alternatives and recommended not to take the medicine and drive or operate heavy machinery. 2. PT:  Prescribed home exercise program.    3. Further studies: No further studies. 4. Interventional:  Consider a cervical JOSE A if symptoms worsen. 5. Follow up:  4-6 weeks      Farhad Bernabe was instructed to call the office if his symptoms worsen or if new symptoms appear prior to the next scheduled visit. He is specifically instructed to contact the office between now & his scheduled appointment if he has concerns related to his condition or if he needs assistance in scheduling the above tests. He is welcome to call for an appointment sooner if he has any additional concerns or questions. Venus Quinn.  Pérez oRse MD, [place], [facility]  Board Certified in [place] Board Certified and Fellowship Trained in [facility] ([facility])             This dictation was performed with a verbal recognition program [facility] ) and it was checked for errors. It is possible that there are still dictated errors within this office note. If so, please bring any errors to my attention for an addendum. All efforts were made to ensure that this office note is accurate.

## 2021-01-29 NOTE — FLOWSHEET NOTE
[place] [facility] [employer]    Physical Therapy Treatment Note/ Progress Report:     Date:  2021    Patient Name:  Linnette Colon    :  1970  MRN: [id number]  Medical/Treatment Diagnosis Information:  · Diagnosis: M54.12 cervical radiculopathy; M47.812 cervical spondylosis without myelopathy; M50.30 DDD, cervical  · Treatment Diagnosis: U26.2  Insurance/Certification information:  PT Insurance Information: Chelsey Cook; 30 visits;  not covered  Physician Information:  Referring Practitioner: GIANLUCA Frost  Plan of care signed (Y/N): y    Date of Patient follow up with Physician:      Progress Report: []  Yes  [x]  No     Functional Scale: NDI: 40%   Date: 2021    Date Range for reporting period:  Beginnin2021  Ending:      Progress report due (10 Rx/or 30 days whichever is less):      Recertification due (POC duration/ or 90 days whichever is less): 2021     Visit # Insurance Allowable Auth Needed   3 30; 71059 not covered []Yes    [x]No     Pain level: 4/10     SUBJECTIVE:  Pt states stiffness posterior portion of (L) hip as well as his normal (L) shoulder pain. Pt states he still gets intermittent (L) UE N/T to his hand with certain activities such as sleeping on (L) side and lifting objects with (L) UE. Pt had MRI with results listed below. Pt saw M.D. and was told to continue therapy for now. Pt told if pain does not improve, JOSE A may be considered. Pt states feeling the neck mobilizations have been beneficial as well as exercises. Pt states although it feels good at the time, he wishes to hold on manipulation this date. MRI Results (from report)  Disc and osteophytes result in moderate narrowing of the neural foramina   bilaterally at C5-6.       Mild narrowing of the neural foramina at C3-4 and C4-5.       No stenosis of the thecal sac in the cervical region.            OBJECTIVE: 2021 ? Observation:   Excessive kyphosis thoracic spine,  Decreased (R) thoracic rotation    Hypomobility throughout upper/mid thoracic spine with spring testing  ? Test measurements:      RESTRICTIONS/PRECAUTIONS: n/a    Exercises/Interventions:   Therapeutic Ex Wt / Resistance Sets/sec Reps Notes                                                                                                        Therapeutic Activities                                                                      Manual Intervention- 25       Shld /GH Mobs       Post Cap mobs       Thoracic/Rib manipulation CT MT/Mobs 5'    manual distraction    PROM MT       Cervical mobilization 8'    C5-7; L rotational mob    Prone thoracic mobilization 12' UPA (L) T2-T4  CPA T1-T6 Gr III/IV    NMR re-education- 15       Open books  1 10 bilat   Thoracic extension  1 10 Over towel roll held 1/29   Supine cervical retraction  5'' 10 Held 1/29   TB rows red 2 10    TB ext red 2 10    No Money yellow  x12 Cueing for scap positioning and neutral head/neck Added 1/29                     Therapeutic Exercise and NMR EXR  [x] (05759) Provided verbal/tactile cueing for activities related to strengthening, flexibility, endurance, ROM  for improvements in scapular, scapulothoracic and UE control with self care, reaching, carrying, lifting, house/yardwork, driving/computer work. [x] (77454) Provided verbal/tactile cueing for activities related to improving balance, coordination, kinesthetic sense, posture, motor skill, proprioception  to assist with  scapular, scapulothoracic and UE control with self care, reaching, carrying, lifting, house/yardwork, driving/computer work.     Therapeutic Activities: [] (83275 or 30020) Provided verbal/tactile cueing for activities related to improving balance, coordination, kinesthetic sense, posture, motor skill, proprioception and motor activation to allow for proper function of scapular, scapulothoracic and UE control with self care, carrying, lifting, driving/computer work.      Home Exercise Program:    [x] (54906) Reviewed/Progressed HEP activities related to strengthening, flexibility, endurance, ROM of scapular, scapulothoracic and UE control with self care, reaching, carrying, lifting, house/yardwork, driving/computer work  [] (36965) Reviewed/Progressed HEP activities related to improving balance, coordination, kinesthetic sense, posture, motor skill, proprioception of scapular, scapulothoracic and UE control with self care, reaching, carrying, lifting, house/yardwork, driving/computer work      Manual Treatments:  PROM / STM / Oscillations-Mobs:  G-I, II, III, IV (PA's, Inf., Post.)  [x] (95945) Provided manual therapy to mobilize soft tissue/joints of cervical/CT, scapular GHJ and UE for the purpose of modulating pain, promoting relaxation,  increasing ROM, reducing/eliminating soft tissue swelling/inflammation/restriction, improving soft tissue extensibility and allowing for proper ROM for normal function with self care, reaching, carrying, lifting, house/yardwork, driving/computer work    Modalities:      Charges:  Timed Code Treatment Minutes: 40   Total Treatment Minutes: 45       [] EVAL (LOW) 98963 (typically 20 minutes face-to-face)  [] EVAL (MOD) 17073 (typically 30 minutes face-to-face)  [] EVAL (HIGH) 92071 (typically 45 minutes face-to-face)  [] RE-EVAL     [x] HQ(83889) x 1    [] IONTO (20279)  [] NMR (59903) x     [] VASO (98754)  [x] Manual (68359) x 2    [] Other:  [] TA (18877)x     [] Mech Traction (91671)  [] ES(attended) (99769)     [] ES (un) (34912):         GOALS:  Patient stated goal: \"get rid of pain and be able to sleep better\" [x] Patient tolerated treatment well [] Patient limited by fatique  [] Patient limited by pain  [] Patient limited by other medical complications  [] Other:     Overall Progression Towards Functional goals/ Treatment Progress Update:  [] Patient is progressing as expected towards functional goals listed. [] Progression is slowed due to complexities/Impairments listed. [] Progression has been slowed due to co-morbidities. [x] Plan just implemented, too soon to assess goals progression <30days   [] Goals require adjustment due to lack of progress  [] Patient is not progressing as expected and requires additional follow up with physician  [] Other    Prognosis for POC: [x] Good [] Fair  [] Poor    Patient requires continued skilled intervention: [x] Yes  [] No      PLAN: Re-assess response to therapy this date  [x] Continue per plan of care [] Alter current plan (see comments)  [] Plan of care initiated [] Hold pending MD visit [] Discharge    Electronically signed by: Ziggy Alejandra PT     Note: If patient does not return for scheduled/recommended follow up visits, this note will serve as a discharge from care along with the most recent update on progress.

## 2021-01-29 NOTE — TELEPHONE ENCOUNTER
Medication:   Requested Prescriptions     Pending Prescriptions Disp Refills    docusate sodium (COLACE) 100 MG capsule [Pharmacy Med Name: DOCUSATE SODIUM 100 MG SOFTGEL] 30 capsule 0     Sig: TAKE 1 CAPSULE BY MOUTH DAILY AS NEEDED FOR CONSTIPATION        Last Filled:  1/13/20    Patient Phone Number: [phone] (home)     Last appt: 12/14/2020   Next appt: 4/12/2021    Last OARRS: No flowsheet data found.

## 2021-02-02 ENCOUNTER — HOSPITAL ENCOUNTER (OUTPATIENT)
Dept: PHYSICAL THERAPY | Age: 51
Setting detail: THERAPIES SERIES
Discharge: HOME | End: 2021-02-02
Payer: COMMERCIAL

## 2021-02-02 RX ORDER — DOCUSATE SODIUM 100 MG/1
100 CAPSULE, LIQUID FILLED ORAL DAILY PRN
Qty: 30 CAPSULE | Refills: 0 | Status: SHIPPED | OUTPATIENT
Start: 2021-02-02 | End: 2021-03-04

## 2021-02-02 NOTE — FLOWSHEET NOTE
[facility]    Physical Therapy  Cancellation/No-show Note  Patient Name:  Yamil Arzola  :  1970   Date:  2021  Cancelled visits to date: 1  No-shows to date: 2    For today's appointment patient:  []  Cancelled  []  Rescheduled appointment  [x]  No-show     Reason given by patient:  []  Patient ill  []  Conflicting appointment  []  No transportation    []  Conflict with work  [x]  No reason given  []  Other:     Comments:      Phone call information:   []  Phone call made today to patient at _ time at number provided:      []  Patient answered, conversation as follows:    []  Patient did not answer, message left as follows:  [x]  Phone call not made today  []  Phone call not needed - pt contacted us to cancel and provided reason for cancellation.      Electronically signed by:  Oracio Avila PT

## 2021-02-02 NOTE — TELEPHONE ENCOUNTER
Medication:   Requested Prescriptions     Pending Prescriptions Disp Refills    docusate sodium (COLACE) 100 MG capsule [Pharmacy Med Name: DOCUSATE SODIUM 100 MG SOFTGEL] 30 capsule 0     Sig: TAKE 1 CAPSULE BY MOUTH DAILY AS NEEDED FOR CONSTIPATION        Last Filled:  1/29/21    Patient Phone Number: [phone] (home)     Last appt: 12/14/2020   Next appt: 4/12/2021    Last OARRS: No flowsheet data found.

## 2021-02-03 ENCOUNTER — OFFICE VISIT (OUTPATIENT)
Dept: ORTHOPEDIC SURGERY | Age: 51
End: 2021-02-03
Payer: COMMERCIAL

## 2021-02-03 VITALS — TEMPERATURE: 97.4 F | WEIGHT: 205.91 LBS | HEIGHT: 72 IN | BODY MASS INDEX: 27.89 KG/M2

## 2021-02-03 DIAGNOSIS — M50.322 DEGENERATION OF C5-C6 INTERVERTEBRAL DISC: Primary | ICD-10-CM

## 2021-02-03 DIAGNOSIS — M50.30 DDD (DEGENERATIVE DISC DISEASE), CERVICAL: ICD-10-CM

## 2021-02-03 DIAGNOSIS — M48.02 FORAMINAL STENOSIS OF CERVICAL REGION: ICD-10-CM

## 2021-02-03 DIAGNOSIS — M54.12 RADICULITIS OF LEFT CERVICAL REGION: ICD-10-CM

## 2021-02-03 PROCEDURE — 3017F COLORECTAL CA SCREEN DOC REV: CPT | Performed by: INTERNAL MEDICINE

## 2021-02-03 PROCEDURE — 99203 OFFICE O/P NEW LOW 30 MIN: CPT | Performed by: INTERNAL MEDICINE

## 2021-02-03 PROCEDURE — G8417 CALC BMI ABV UP PARAM F/U: HCPCS | Performed by: INTERNAL MEDICINE

## 2021-02-03 PROCEDURE — G8427 DOCREV CUR MEDS BY ELIG CLIN: HCPCS | Performed by: INTERNAL MEDICINE

## 2021-02-03 PROCEDURE — G8482 FLU IMMUNIZE ORDER/ADMIN: HCPCS | Performed by: INTERNAL MEDICINE

## 2021-02-03 PROCEDURE — 1036F TOBACCO NON-USER: CPT | Performed by: INTERNAL MEDICINE

## 2021-02-03 RX ORDER — MELOXICAM 15 MG/1
15 TABLET ORAL DAILY
Qty: 30 TABLET | Refills: 2 | Status: SHIPPED | OUTPATIENT
Start: 2021-02-03 | End: 2021-04-23

## 2021-02-03 RX ORDER — CYCLOBENZAPRINE HCL 10 MG
10 TABLET ORAL NIGHTLY PRN
Qty: 30 TABLET | Refills: 1 | Status: SHIPPED | OUTPATIENT
Start: 2021-02-03 | End: 2021-04-12

## 2021-02-03 RX ORDER — GABAPENTIN 300 MG/1
CAPSULE ORAL
Qty: 30 CAPSULE | Refills: 1 | Status: SHIPPED | OUTPATIENT
Start: 2021-02-03 | End: 2022-06-28 | Stop reason: SDUPTHER

## 2021-02-03 NOTE — PROGRESS NOTES
 allopurinol (ZYLOPRIM) 100 MG tablet TAKE 1 TABLET BY MOUTH TWICE A  tablet 1    metFORMIN (GLUCOPHAGE) 500 MG tablet TAKE 1 TABLET BY MOUTH TWICE A DAY WITH MEALS 180 tablet 1    Blood Glucose Monitoring Suppl KIT 1 kit by Does not apply route daily Dispense according to insurance formulary 1 kit 0    blood glucose test strips (ASCENSIA AUTODISC VI;ONE TOUCH ULTRA TEST VI) strip 1 each by In Vitro route daily Test as directed,Dispense according to insurance formulary 100 each 2    Lancets MISC 1 each by Does not apply route daily Test as directed, Dispense according to insurance formulary 100 each 2    latanoprost (XALATAN) 0.005 % ophthalmic solution INSTILL ONE DROP IN BOTH EYES AT BEDTIME      blood glucose monitor strips Test 1 times a day & as needed for symptoms of irregular blood glucose. Dispense sufficient amount for indicated testing frequency plus additional to accommodate PRN testing needs. 100 strip 1    Blood Glucose Monitoring Suppl (D-CARE GLUCOMETER) w/Device KIT 1 applicator by Does not apply route daily 30 kit 0    atorvastatin (LIPITOR) 40 MG tablet Take 1 tablet by mouth daily 90 tablet 3    cetirizine (ZYRTEC) 10 MG tablet Take 10 mg by mouth daily      polyethyl glycol-propyl glycol 0.4-0.3 % (SYSTANE) 0.4-0.3 % ophthalmic solution 1 drop as needed for Dry Eyes      diclofenac sodium (VOLTAREN) 1 % GEL Apply 2 g topically 2 times daily      albuterol sulfate HFA (VENTOLIN HFA) 108 (90 Base) MCG/ACT inhaler Inhale 2 puffs into the lungs every 6 hours as needed for Wheezing      fluticasone (FLONASE) 50 MCG/ACT nasal spray 1 spray by Each Nostril route daily      Hypertonic Nasal Wash (SINUS RINSE NA) by Nasal route      ibuprofen (ADVIL;MOTRIN) 600 MG tablet Take 1 tablet by mouth every 8 hours as needed for Pain 30 tablet 0     No current facility-administered medications for this visit.           Allergies:      No Known Allergies     Social History: Social History     Socioeconomic History    Marital status: Single     Spouse name: Not on file    Number of children: Not on file    Years of education: Not on file    Highest education level: Not on file   Occupational History    Not on file   Social Needs    Financial resource strain: Not on file    Food insecurity     Worry: Not on file     Inability: Not on file    Transportation needs     Medical: Not on file     Non-medical: Not on file   Tobacco Use    Smoking status: Never Smoker    Smokeless tobacco: Never Used   Substance and Sexual Activity    Alcohol use: Not Currently    Drug use: Never    Sexual activity: Yes     Partners: Female   Lifestyle    Physical activity     Days per week: Not on file     Minutes per session: Not on file    Stress: Not on file   Relationships    Social connections     Talks on phone: Not on file     Gets together: Not on file     Attends Judaism service: Not on file     Active member of club or organization: Not on file     Attends meetings of clubs or organizations: Not on file     Relationship status: Not on file    Intimate partner violence     Fear of current or ex partner: Not on file     Emotionally abused: Not on file     Physically abused: Not on file     Forced sexual activity: Not on file   Other Topics Concern    Not on file   Social History Narrative    Not on file        Review of Symptoms:    Pertinent items are noted in HPI    Review of systems reviewed from Patient History Form dated on today's date and   available in the patient's chart under the Media tab. Vital Signs: There were no vitals filed for this visit. General Exam:     Constitutional: Patient is adequately groomed with no evidence of malnutrition  Mental Status: The patient is oriented to time, place and person. The patient's mood and affect are appropriate. Vascular: Examination reveals no swelling or calf tenderness. Peripheral pulses are palpable and 2+. Lymphatics: no lymphadenopathy of the cervical or axillary regions or upper extremity      Physical Exam: Cervical neck      Primary Exam:    Inspection: No deformity atrophy appreciable curvature      Palpation: No focal trigger point tenderness      Range of Motion: Mild to moderate restriction in extension, mild restriction in flexion, mild symmetric restriction with rotation left and right reproduction of brachialgia with extension      Strength: Normal upper extremity      Special Tests: Spurling sign purposely not assessed      Skin: There are no rashes, ulcerations or lesions. Gait: Nonantalgic non-ataxic      Reflex intact upper     Additional Comments:        Additional Examinations:           Neurologic -Light touch sensation and manual muscle testing is normal C5-C8 . Biceps and triceps reflexes are symmetric and +2. Spurlling sign is negative            Office Imaging Results/Procedures PerformedToday:           Office Procedures:   No orders of the defined types were placed in this encounter. Other Outside Imaging and Testing Personally Reviewed:    EXAMINATION:   MRI OF THE CERVICAL SPINE WITHOUT CONTRAST 1/21/2021 8:51 am       TECHNIQUE:   Multiplanar multisequence MRI of the cervical spine was performed without the   administration of intravenous contrast.       COMPARISON:   None       HISTORY:   ORDERING SYSTEM PROVIDED HISTORY: Cervical radiculopathy   TECHNOLOGIST PROVIDED HISTORY:   Reason for exam:->MRI CSP WO CONTRAST   Reason for Exam: Pt states neck pain, radiates into left arm- numbness. Also   states back pain x 1-2 years   Acuity: Chronic   Type of Exam: Initial       FINDINGS:   BONES/ALIGNMENT: There is normal alignment of the spine. The vertebral body   heights are maintained.  The bone marrow signal appears unremarkable.  There   is minimal degenerative disc disease with disc space narrowing and   osteophytes at C2-3, C3-4, C4-5, C5-6, and C6-7.     SPINAL CORD:  No abnormal signal is identified within the spinal cord.       SOFT TISSUES: No paraspinal mass identified.       C2-C3: There is a disc protrusion measuring 2 mm. The thecal sac and neural   foramina are intact.       C3-C4:  Disc and/or osteophytes result in mild narrowing of the neural   foramina bilaterally.  The thecal sac is not stenotic.       C4-C5: Disc and/or osteophytes cause minimal narrowing of the neural foramina   bilaterally.  The thecal sac is not stenotic.       C5-C6: There is a disc protrusion or osteophyte measuring 2-3 mm.  The thecal   sac is not stenotic.   Disc and/or osteophytes result in moderate narrowing   of the neural foramina bilaterally.       C6-C7:  The thecal sac and neural foramina are intact.       C7-T1:  The thecal sac and neural foramina are intact.           Impression   Disc and osteophytes result in moderate narrowing of the neural foramina   bilaterally at C5-6.       Mild narrowing of the neural foramina at C3-4 and C4-5.       No stenosis of the thecal sac in the cervical region.                   Assessment   Impression: . Encounter Diagnoses   Name Primary?  Degeneration of C5-C6 intervertebral disc Yes    Foraminal stenosis of cervical region     DDD (degenerative disc disease), cervical     Radiculitis of left cervical region               Plan:        Activity modification cervical disc protocol  Continue maintenance I HEP and hold physical therapy after Friday's session  Proceed with C JAME C5/C6 left translaminar  Medical pain management Multimodal-gabapentin meloxicam and muscle relaxant  Ultram as needed if symptoms become more pervasive The nature of the finding, probable diagnosis and likely treatment was thoroughly discussed with the patient. The options, risks, complications, alternative treatment as well as some of the differential diagnosis was discussed. The patient was thoroughly informed and all questions were answered. the patient indicated understanding and satisfaction with the discussion. Orders:      No orders of the defined types were placed in this encounter. Disclaimer: \"This note was dictated with voice recognition software. Though review and correction are routine, we apologize for any errors. \"

## 2021-02-05 ENCOUNTER — HOSPITAL ENCOUNTER (OUTPATIENT)
Dept: PHYSICAL THERAPY | Age: 51
Setting detail: THERAPIES SERIES
Discharge: HOME | End: 2021-02-05
Payer: COMMERCIAL

## 2021-02-05 NOTE — FLOWSHEET NOTE
[facility]    Physical Therapy  Cancellation/No-show Note  Patient Name:  Zaynab Mohamud  :  1970   Date:  2021  Cancelled visits to date: 1  No-shows to date: 3    For today's appointment patient:  []  Cancelled  []  Rescheduled appointment  [x]  No-show     Reason given by patient:  []  Patient ill  []  Conflicting appointment  []  No transportation    []  Conflict with work  [x]  No reason given  []  Other:     Comments:      Phone call information:   []  Phone call made today to patient at _ time at number provided:      []  Patient answered, conversation as follows:    []  Patient did not answer, message left as follows:  [x]  Phone call not made today  []  Phone call not needed - pt contacted us to cancel and provided reason for cancellation.      Electronically signed by:  Tino Nageotte, PT

## 2021-02-19 ENCOUNTER — HOSPITAL ENCOUNTER (OUTPATIENT)
Dept: INTERVENTIONAL RADIOLOGY/VASCULAR | Age: 51
Discharge: HOME | End: 2021-02-19
Payer: COMMERCIAL

## 2021-02-19 DIAGNOSIS — M54.2 NECK PAIN: ICD-10-CM

## 2021-02-19 PROCEDURE — 6360000004 HC RX CONTRAST MEDICATION: Performed by: RADIOLOGY

## 2021-02-19 PROCEDURE — 6360000002 HC RX W HCPCS

## 2021-02-19 PROCEDURE — 2500000003 HC RX 250 WO HCPCS

## 2021-02-19 PROCEDURE — 62321 NJX INTERLAMINAR CRV/THRC: CPT

## 2021-02-19 RX ADMIN — IOHEXOL 10 ML: 180 INJECTION INTRAVENOUS at 13:35

## 2021-03-02 ENCOUNTER — OFFICE VISIT (OUTPATIENT)
Dept: ORTHOPEDIC SURGERY | Age: 51
End: 2021-03-02
Payer: COMMERCIAL

## 2021-03-02 VITALS — WEIGHT: 205 LBS | HEIGHT: 72 IN | BODY MASS INDEX: 27.77 KG/M2

## 2021-03-02 DIAGNOSIS — M48.02 FORAMINAL STENOSIS OF CERVICAL REGION: ICD-10-CM

## 2021-03-02 DIAGNOSIS — G89.18 PAIN FOLLOWING SURGERY OR PROCEDURE: ICD-10-CM

## 2021-03-02 DIAGNOSIS — M54.12 RADICULITIS OF LEFT CERVICAL REGION: ICD-10-CM

## 2021-03-02 DIAGNOSIS — M50.322 DEGENERATION OF C5-C6 INTERVERTEBRAL DISC: Primary | ICD-10-CM

## 2021-03-02 DIAGNOSIS — M50.30 DDD (DEGENERATIVE DISC DISEASE), CERVICAL: ICD-10-CM

## 2021-03-02 PROCEDURE — 99213 OFFICE O/P EST LOW 20 MIN: CPT | Performed by: INTERNAL MEDICINE

## 2021-03-02 PROCEDURE — 3017F COLORECTAL CA SCREEN DOC REV: CPT | Performed by: INTERNAL MEDICINE

## 2021-03-02 PROCEDURE — 1036F TOBACCO NON-USER: CPT | Performed by: INTERNAL MEDICINE

## 2021-03-02 PROCEDURE — G8427 DOCREV CUR MEDS BY ELIG CLIN: HCPCS | Performed by: INTERNAL MEDICINE

## 2021-03-02 PROCEDURE — G8482 FLU IMMUNIZE ORDER/ADMIN: HCPCS | Performed by: INTERNAL MEDICINE

## 2021-03-02 PROCEDURE — G8417 CALC BMI ABV UP PARAM F/U: HCPCS | Performed by: INTERNAL MEDICINE

## 2021-03-02 NOTE — PROGRESS NOTES
Chief Complaint:   Chief Complaint   Patient presents with    Neck Pain     follow up JOSE A inj- decreased pain, tingling left hand and wrist, jaw, 2/10 pain          History of Present Illness:       Patient is a 45 y.o. male returns follow up for the above complaint. The patient was last seen approximately 1 monthsago. The symptoms are improving since the last visit. The patient has had no further testing for the problem. In the interim he underwent C JAME and estimates at least 50% improvement. He is experiencing some residual numbness and tingling primarily in the index finger of the left upper extremity.   He notes some soreness and discomfort in the lateral neck related to the procedure with radiation into the jaw at times    He denies any new onset or progressive weakness of the left upper extremity    He denies any new onset bowel or bladder dysfunction or new onset gait disturbance    He continues on low-dose gabapentin without side effects    Pain levels 2/10     Past Medical History:        Past Medical History:   Diagnosis Date    Enlarged prostate         Present Medications:         Current Outpatient Medications   Medication Sig Dispense Refill    gabapentin (NEURONTIN) 300 MG capsule Take 1 tablet nightly 30 capsule 1    cyclobenzaprine (FLEXERIL) 10 MG tablet Take 1 tablet by mouth nightly as needed for Muscle spasms 30 tablet 1    meloxicam (MOBIC) 15 MG tablet Take 1 tablet by mouth daily 30 tablet 2    docusate sodium (COLACE) 100 MG capsule TAKE 1 CAPSULE BY MOUTH DAILY AS NEEDED FOR CONSTIPATION 30 capsule 0    allopurinol (ZYLOPRIM) 100 MG tablet TAKE 1 TABLET BY MOUTH TWICE A  tablet 1    metFORMIN (GLUCOPHAGE) 500 MG tablet TAKE 1 TABLET BY MOUTH TWICE A DAY WITH MEALS 180 tablet 1    Blood Glucose Monitoring Suppl KIT 1 kit by Does not apply route daily Dispense according to insurance formulary 1 kit 0  blood glucose test strips (ASCENSIA AUTODISC VI;ONE TOUCH ULTRA TEST VI) strip 1 each by In Vitro route daily Test as directed,Dispense according to insurance formulary 100 each 2    Lancets MISC 1 each by Does not apply route daily Test as directed, Dispense according to insurance formulary 100 each 2    latanoprost (XALATAN) 0.005 % ophthalmic solution INSTILL ONE DROP IN BOTH EYES AT BEDTIME      blood glucose monitor strips Test 1 times a day & as needed for symptoms of irregular blood glucose. Dispense sufficient amount for indicated testing frequency plus additional to accommodate PRN testing needs. 100 strip 1    Blood Glucose Monitoring Suppl (D-CARE GLUCOMETER) w/Device KIT 1 applicator by Does not apply route daily 30 kit 0    atorvastatin (LIPITOR) 40 MG tablet Take 1 tablet by mouth daily 90 tablet 3    cetirizine (ZYRTEC) 10 MG tablet Take 10 mg by mouth daily      polyethyl glycol-propyl glycol 0.4-0.3 % (SYSTANE) 0.4-0.3 % ophthalmic solution 1 drop as needed for Dry Eyes      diclofenac sodium (VOLTAREN) 1 % GEL Apply 2 g topically 2 times daily      albuterol sulfate HFA (VENTOLIN HFA) 108 (90 Base) MCG/ACT inhaler Inhale 2 puffs into the lungs every 6 hours as needed for Wheezing      fluticasone (FLONASE) 50 MCG/ACT nasal spray 1 spray by Each Nostril route daily      Hypertonic Nasal Wash (SINUS RINSE NA) by Nasal route      ibuprofen (ADVIL;MOTRIN) 600 MG tablet Take 1 tablet by mouth every 8 hours as needed for Pain 30 tablet 0     No current facility-administered medications for this visit. Allergies:      No Known Allergies        Review of Systems:    Pertinent items are noted in HPI         Vital Signs: There were no vitals filed for this visit.      General Exam:     Constitutional: Patient is adequately groomed with no evidence of malnutrition    Physical Exam: Cervical neck      Primary Exam:    Inspection: No deformity atrophy appreciable curvature Palpation: No focal trigger point tenderness      Range of Motion: Mild restriction in flexion, moderate restriction with extension and with rotation left and right discomfort in all ranges more pronounced with rotation to the left and right       Strength: Normal upper extremity      Special Tests: Spurling sign purposely not assessed      Skin: There are no rashes, ulcerations or lesions. Gait: Nonantalgic     Neurovascular -asymmetric decreased light touch sensation index finger and normal at the thumb no other focal motor or sensory deficits. Additional Comments:        Additional Examinations:                    Office Imaging Results/Procedures PerformedToday:         Epidural steroid injection, Epidurography       History : neck pain       COMMENTS :       The posterior neck was prepped and draped in sterile fashion and lidocaine used for local anesthesia.  Under fluoroscopic guidance, a 22 gauge Tuohy needle was advanced into the epidural space at the C5-6 level from an interlaminar approach and needle    position was documented with contrast injection.  12 mg Celestone and 2 cc sterile saline were injected.  The patient tolerated the procedure without complication.         DIAGNOSIS :       C5-6 translaminar epidural injection of Celestone.           Fluoroscopy time : 0.8 minutes   Number of exposures obtained : 1   Blood loss : minimal (less than 5 cc)          Office Procedures:     Orders Placed This Encounter   Procedures    Ambulatory epidural steroid injection     Left-translaminar C5/C6     Standing Status:   Future     Standing Expiration Date:   3/2/2022     Scheduling Instructions:      Dr. Tena Perez           Other Outside Imaging and Testing Personally Reviewed:  [address] [place] [address] WITHOUT CONTRAST 1/21/2021 8:51 am       TECHNIQUE:   Multiplanar multisequence MRI of the cervical spine was performed without the   administration of intravenous contrast.     COMPARISON:   None       HISTORY:   ORDERING SYSTEM PROVIDED HISTORY: Cervical radiculopathy   TECHNOLOGIST PROVIDED HISTORY:   Reason for exam:->MRI CSP WO CONTRAST   Reason for Exam: Pt states neck pain, radiates into left arm- numbness. Also   states back pain x 1-2 years   Acuity: Chronic   Type of Exam: Initial       FINDINGS:   BONES/ALIGNMENT: There is normal alignment of the spine. The vertebral body   heights are maintained. The bone marrow signal appears unremarkable.  There   is minimal degenerative disc disease with disc space narrowing and   osteophytes at C2-3, C3-4, C4-5, C5-6, and C6-7.       SPINAL CORD:  No abnormal signal is identified within the spinal cord.       SOFT TISSUES: No paraspinal mass identified.       C2-C3: There is a disc protrusion measuring 2 mm. The thecal sac and neural   foramina are intact.       C3-C4:  Disc and/or osteophytes result in mild narrowing of the neural   foramina bilaterally.  The thecal sac is not stenotic.       C4-C5: Disc and/or osteophytes cause minimal narrowing of the neural foramina   bilaterally.  The thecal sac is not stenotic.       C5-C6: There is a disc protrusion or osteophyte measuring 2-3 mm.  The thecal   sac is not stenotic.   Disc and/or osteophytes result in moderate narrowing   of the neural foramina bilaterally.       C6-C7:  The thecal sac and neural foramina are intact.       C7-T1:  The thecal sac and neural foramina are intact.           Impression   Disc and osteophytes result in moderate narrowing of the neural foramina   bilaterally at C5-6.       Mild narrowing of the neural foramina at C3-4 and C4-5.       No stenosis of the thecal sac in the cervical region.                   Assessment   Impression: . Encounter Diagnoses   Name Primary?     Degeneration of C5-C6 intervertebral disc Yes    Foraminal stenosis of cervical region     DDD (degenerative disc disease), cervical     Radiculitis of left cervical region  Pain following surgery or procedure         Status post C JAME x 50% relief      Plan:     Consider repeat C JAME no sooner than 3 weeks C5/C6 left translaminar  Resume/progress PT cervical stabilization and traction trial therapeutic  Clinical follow-up 7 to 10 days after second C JAME if warranted he is to cancel this if his symptoms considerably improved in the interim  Consider orthopedic spine surgery consultation. Monitor neck pain and jaw pain relative to recent procedure       Orders:        Orders Placed This Encounter   Procedures    Ambulatory epidural steroid injection     Left-translaminar C5/C6     Standing Status:   Future     Standing Expiration Date:   3/2/2022     Scheduling Instructions:      Dr. Daya Barahona MD.      Greg Milner: \"This note was dictated with voice recognition software. Though review and correction are routine, we apologize for any errors. \"

## 2021-03-09 ENCOUNTER — HOSPITAL ENCOUNTER (OUTPATIENT)
Dept: PHYSICAL THERAPY | Age: 51
Setting detail: THERAPIES SERIES
Discharge: HOME | End: 2021-03-09
Payer: COMMERCIAL

## 2021-03-09 PROCEDURE — 97112 NEUROMUSCULAR REEDUCATION: CPT

## 2021-03-09 PROCEDURE — 97140 MANUAL THERAPY 1/> REGIONS: CPT

## 2021-03-09 PROCEDURE — 97110 THERAPEUTIC EXERCISES: CPT

## 2021-03-09 NOTE — PLAN OF CARE
[address], [facility]     Physical Therapy Re-Certification Plan of Care    Dear  Nicci Magana,    We had the pleasure of treating the following patient for physical therapy services at [address] [place] [address]. A summary of our findings can be found in the updated assessment below. This includes our plan of care. If you have any questions or concerns regarding these findings, please do not hesitate to contact me at the office phone number checked above.   Thank you for the referral.     Physician Signature:________________________________Date:__________________  By signing above (or electronic signature), therapists plan is approved by physician    Date Range Of Visits: 2021-3/9/2021  Total Visits to Date: 4  Overall Response to Treatment:   [x]Patient is responding well to treatment and improvement is noted with regards  to goals   [x]Patient should continue to improve in reasonable time if they continue HEP   []Patient has plateaued and is no longer responding to skilled PT intervention    []Patient is getting worse and would benefit from return to referring MD   []Patient unable to adhere to initial POC   [x]Other: returns to therapy following cervical injection; expected to progress as expected focusing on lower cervical/upper thoracic mobility and cervical and postural strength and endurance training      Physical Therapy Treatment Note/ Progress Report:     Date:  3/9/2021    Patient Name:  Flip Walters    :  1970  MRN: [id number]  Medical/Treatment Diagnosis Information:  · Diagnosis: M54.12 cervical radiculopathy; M47.812 cervical spondylosis without myelopathy; M50.30 DDD, cervical  · Treatment Diagnosis: R58.1  Insurance/Certification information:  [place] [facility] [place] [id number] not covered  Physician Information:  Referring Practitioner: GIANLUCA Chambers  Plan of care signed (Y/N): sherrell Date of Patient follow up with Physician:      Progress Report: [x]  Yes  []  No     Functional Scale: NDI: 46%   Date: 3/9/2021    Date Range for reporting period:  Beginnin2021  Ending:      Progress report due (10 Rx/or 30 days whichever is less):      Recertification due (POC duration/ or 90 days whichever is less): 2021     Visit # Insurance Allowable Auth Needed    30; 36700 not covered []Yes    [x]No     Pain level: 3/10     SUBJECTIVE:  Pt returns to PT following cervical injection to L cervical spine w/ approximately 50% improvement in symptoms. State he still has soreness and tightness in L side of neck that can cause some pain referring to jaw and headache. Still reports some N/T in L arm/hand but states the distal pain is significantly improved. MRI Results (from report)  Disc and osteophytes result in moderate narrowing of the neural foramina   bilaterally at C5-6.       Mild narrowing of the neural foramina at C3-4 and C4-5.       No stenosis of the thecal sac in the cervical region.        OBJECTIVE: 2021   Observation:   Excessive kyphosis thoracic spine,  Decreased (R) thoracic rotation    Hypomobility throughout upper/mid thoracic spine with spring testing   Test measurements:      RESTRICTIONS/PRECAUTIONS: n/a    Exercises/Interventions:   Therapeutic Ex Wt / Resistance Sets/sec Reps Notes                                  Therapeutic Activities                     Manual Intervention- 21       Shld /GH Mobs       Post Cap mobs       Thoracic/Rib manipulation     CT MT/Mobs 5'    manual distraction    STM 8   L SCM/scalenes   Cervical mobilization 8'    C5-7; L rotational mob    Prone thoracic mobilization  UPA (L) T2-T4  CPA T1-T6 Gr III/IV Held 2/2 time   NMR re-education- 20       Open books  1 10 bilat   Thoracic extension    Over towel roll held    Supine cervical retraction  5'' 10    TB rows       TB ext       No Money    Cueing for scap positioning and neutral head/neck Added 1/29   Cervical SNAG for L cervical rotation  1 10               Therapeutic Exercise and NMR EXR  [x] (33745) Provided verbal/tactile cueing for activities related to strengthening, flexibility, endurance, ROM  for improvements in scapular, scapulothoracic and UE control with self care, reaching, carrying, lifting, house/yardwork, driving/computer work. [x] (60910) Provided verbal/tactile cueing for activities related to improving balance, coordination, kinesthetic sense, posture, motor skill, proprioception  to assist with  scapular, scapulothoracic and UE control with self care, reaching, carrying, lifting, house/yardwork, driving/computer work. Therapeutic Activities:    [] (76196 or 16674) Provided verbal/tactile cueing for activities related to improving balance, coordination, kinesthetic sense, posture, motor skill, proprioception and motor activation to allow for proper function of scapular, scapulothoracic and UE control with self care, carrying, lifting, driving/computer work.      Home Exercise Program:    [x] (33962) Reviewed/Progressed HEP activities related to strengthening, flexibility, endurance, ROM of scapular, scapulothoracic and UE control with self care, reaching, carrying, lifting, house/yardwork, driving/computer work  [] (47944) Reviewed/Progressed HEP activities related to improving balance, coordination, kinesthetic sense, posture, motor skill, proprioception of scapular, scapulothoracic and UE control with self care, reaching, carrying, lifting, house/yardwork, driving/computer work      Manual Treatments:  PROM / STM / Oscillations-Mobs:  G-I, II, III, IV (PA's, Inf., Post.)  [x] (02107) Provided manual therapy to mobilize soft tissue/joints of cervical/CT, scapular GHJ and UE for the purpose of modulating pain, promoting relaxation,  increasing ROM, reducing/eliminating soft tissue swelling/inflammation/restriction, improving soft tissue extensibility and allowing for proper ROM for normal function with self care, reaching, carrying, lifting, house/yardwork, driving/computer work    Modalities:      Charges:  Timed Code Treatment Minutes: 41   Total Treatment Minutes: 45       [] EVAL (LOW) 04085 (typically 20 minutes face-to-face)  [] EVAL (MOD) 17281 (typically 30 minutes face-to-face)  [] EVAL (HIGH) 00023 (typically 45 minutes face-to-face)  [] RE-EVAL     [] EA(76779) x     [] IONTO (50120)  [x] NMR (96594) x 1     [] VASO (07189)  [x] Manual (25233) x 2    [] Other:  [] TA (29157)x     [] Mech Traction (92741)  [] ES(attended) (88713)     [] ES (un) (74120):         GOALS:  Patient stated goal: \"get rid of pain and be able to sleep better\"  [] Progressing: [] Met: [x] Not Met: [] Adjusted    Therapist goals for Patient:   Short Term Goals: To be achieved in: 2 weeks  1. Independent in HEP and progression per patient tolerance, in order to prevent re-injury. [] Progressing: [] Met: [x] Not Met: [] Adjusted    2. Patient will have a decrease in pain to facilitate improvement in movement, function, and ADLs as indicated by Functional Deficits. [] Progressing: [] Met: [x] Not Met: [] Adjusted    Long Term Goals: To be achieved in: 4 weeks  1. Disability index score of 10% or less for the NDI to assist with reaching prior level of function. [] Progressing: [] Met: [x] Not Met: [] Adjusted  2. Patient will demonstrate increased AROM to [facility] of cervical/thoracic spine to allow for proper joint functioning as indicated by patients Functional Deficits. [] Progressing: [] Met: [x] Not Met: [] Adjusted  3. Patient will demonstrate an increase in postural awareness and control and activation of  Deep cervical stabilizers to allow for proper functional mobility as indicated by patients Functional Deficits. [] Progressing: [] Met: [x] Not Met: [] Adjusted  4. Patient will return to daily functional activities without increased symptoms or restriction.   [] Progressing: [] Met: [x] Not Met: [] Adjusted   5. Pt will be able to sleep through the night with increased symptoms or restrictions. [] Progressing: [] Met: [x] Not Met: [] Adjusted      ASSESSMENT: Reassessment of symptoms demonstrated decreased cervical rotation L > R w/ increased N/T in L hand. R SB decreased L UE distal symptoms as well as cervical distraction. Increased tone and tightness noted L SCM and scalenes compared to R, likely contributing to referred headaches and jaw pain. Pt reported \"feeling good\" w/ cervical SNAGs. Reviewed HEP w/ good tolerance. Will benefit from increased lower cervical and upper thoracic mobility and improving postural endurance to prevent excessive lower cervical extension. Treatment/Activity Tolerance:  [x] Patient tolerated treatment well [] Patient limited by fatique  [] Patient limited by pain  [] Patient limited by other medical complications  [] Other:     Overall Progression Towards Functional goals/ Treatment Progress Update:  [x] Patient is progressing as expected towards functional goals listed. [] Progression is slowed due to complexities/Impairments listed. [] Progression has been slowed due to co-morbidities. [] Plan just implemented, too soon to assess goals progression <30days   [] Goals require adjustment due to lack of progress  [] Patient is not progressing as expected and requires additional follow up with physician  [] Other    Prognosis for POC: [x] Good [] Fair  [] Poor    Patient requires continued skilled intervention: [x] Yes  [] No      PLAN: 1x/week x4 weeks  [x] Continue per plan of care [] Alter current plan (see comments)  [] Plan of care initiated [] Hold pending MD visit [] Discharge    Electronically signed by: Tino Nageotte, PT     Note: If patient does not return for scheduled/recommended follow up visits, this note will serve as a discharge from care along with the most recent update on progress.

## 2021-03-16 ENCOUNTER — HOSPITAL ENCOUNTER (OUTPATIENT)
Dept: PHYSICAL THERAPY | Age: 51
Setting detail: THERAPIES SERIES
Discharge: HOME | End: 2021-03-16
Payer: COMMERCIAL

## 2021-03-16 PROCEDURE — 97112 NEUROMUSCULAR REEDUCATION: CPT

## 2021-03-16 PROCEDURE — 97140 MANUAL THERAPY 1/> REGIONS: CPT

## 2021-03-16 NOTE — FLOWSHEET NOTE
[facility] [employer]    Physical Therapy Treatment Note/ Progress Report:     Date:  3/16/2021    Patient Name:  Kelsie Chavez    :  1970  MRN: [id number]  Medical/Treatment Diagnosis Information:  · Diagnosis: M54.12 cervical radiculopathy; M47.812 cervical spondylosis without myelopathy; M50.30 DDD, cervical  · Treatment Diagnosis: R52.3  Insurance/Certification information:  PT Insurance Information: [address]; 30 visits; [id number] not covered  Physician Information:  Referring Practitioner: GIANLUCA Marsh  Plan of care signed (Y/N): y    Date of Patient follow up with Physician:      Progress Report: []  Yes  [x]  No     Functional Scale: NDI: 46%   Date: 3/9/2021    Date Range for reporting period:  Beginnin2021  Ending:      Progress report due (10 Rx/or 30 days whichever is less):      Recertification due (POC duration/ or 90 days whichever is less): 2021     Visit # Insurance Allowable Auth Needed   ; 38120 not covered []Yes    [x]No     Pain level: 3/10     SUBJECTIVE:  Pt states he had relief of symptoms w/ minimal tingling until the next morning, but does states that he's had several headaches since last visit. States he has a little bit of a headache and a little tingling in L shoulder and hand currently. Feels like the HEP exercises given last session are helping. MRI Results (from report)  Disc and osteophytes result in moderate narrowing of the neural foramina   bilaterally at C5-6.       Mild narrowing of the neural foramina at C3-4 and C4-5.       No stenosis of the thecal sac in the cervical region.        OBJECTIVE: 2021   Observation:   Excessive kyphosis thoracic spine,  Decreased (R) thoracic rotation    Hypomobility throughout upper/mid thoracic spine with spring testing   Test measurements:      RESTRICTIONS/PRECAUTIONS: n/a    Exercises/Interventions:   Therapeutic Ex Wt / Resistance Sets/sec Reps Notes   TB rows green 2 10    No money yellow 2 10 Cues for posture                     Therapeutic Activities                     Manual Intervention- 25       Shld /GH Mobs       Post Cap mobs       Thoracic/Rib manipulation     CT MT/Mobs 5'    manual distraction    STM 8   L SCM/scalenes   Cervical mobilization 8'    C5-7; L rotational mob    Prone thoracic mobilization  UPA (L) T2-T4  CPA T1-T6 Gr III/IV Held 2/2 time   NMR re-education- 10       Open books  1 10 bilat   Thoracic extension    Over towel roll held 1/29   Supine cervical retraction  5'' 10    Cervical SNAG for L cervical rotation  1 10 HEP              Therapeutic Exercise and NMR EXR  [x] (94136) Provided verbal/tactile cueing for activities related to strengthening, flexibility, endurance, ROM  for improvements in scapular, scapulothoracic and UE control with self care, reaching, carrying, lifting, house/yardwork, driving/computer work. [x] (82303) Provided verbal/tactile cueing for activities related to improving balance, coordination, kinesthetic sense, posture, motor skill, proprioception  to assist with  scapular, scapulothoracic and UE control with self care, reaching, carrying, lifting, house/yardwork, driving/computer work. Therapeutic Activities:    [] (92061 or 31783) Provided verbal/tactile cueing for activities related to improving balance, coordination, kinesthetic sense, posture, motor skill, proprioception and motor activation to allow for proper function of scapular, scapulothoracic and UE control with self care, carrying, lifting, driving/computer work.      Home Exercise Program:    [x] (51220) Reviewed/Progressed HEP activities related to strengthening, flexibility, endurance, ROM of scapular, scapulothoracic and UE control with self care, reaching, carrying, lifting, house/yardwork, driving/computer work  [] (17980) Reviewed/Progressed HEP activities related to improving balance, coordination, kinesthetic sense, posture, motor skill, proprioception of scapular, scapulothoracic and UE control with self care, reaching, carrying, lifting, house/yardwork, driving/computer work      Manual Treatments:  PROM / STM / Oscillations-Mobs:  G-I, II, III, IV (PA's, Inf., Post.)  [x] (35385) Provided manual therapy to mobilize soft tissue/joints of cervical/CT, scapular GHJ and UE for the purpose of modulating pain, promoting relaxation,  increasing ROM, reducing/eliminating soft tissue swelling/inflammation/restriction, improving soft tissue extensibility and allowing for proper ROM for normal function with self care, reaching, carrying, lifting, house/yardwork, driving/computer work    Modalities:      Charges:  Timed Code Treatment Minutes: 41   Total Treatment Minutes: 45       [] EVAL (LOW) 45445 (typically 20 minutes face-to-face)  [] EVAL (MOD) 80014 (typically 30 minutes face-to-face)  [] EVAL (HIGH) 01497 (typically 45 minutes face-to-face)  [] RE-EVAL     [] YR(26300) x     [] IONTO (60200)  [x] NMR (06589) x 1     [] VASO (91292)  [x] Manual (81309) x 2    [] Other:  [] TA (91139)x     [] Mech Traction (79576)  [] ES(attended) (74058)     [] ES (un) (83251):         GOALS:  Patient stated goal: \"get rid of pain and be able to sleep better\"  [] Progressing: [] Met: [x] Not Met: [] Adjusted    Therapist goals for Patient:   Short Term Goals: To be achieved in: 2 weeks  1. Independent in HEP and progression per patient tolerance, in order to prevent re-injury. [] Progressing: [] Met: [x] Not Met: [] Adjusted    2. Patient will have a decrease in pain to facilitate improvement in movement, function, and ADLs as indicated by Functional Deficits. [] Progressing: [] Met: [x] Not Met: [] Adjusted    Long Term Goals: To be achieved in: 4 weeks  1. Disability index score of 10% or less for the NDI to assist with reaching prior level of function. [] Progressing: [] Met: [x] Not Met: [] Adjusted  2.  Patient will demonstrate increased AROM to [facility] of cervical/thoracic spine to allow for proper joint functioning as indicated by patients Functional Deficits. [] Progressing: [] Met: [x] Not Met: [] Adjusted  3. Patient will demonstrate an increase in postural awareness and control and activation of  Deep cervical stabilizers to allow for proper functional mobility as indicated by patients Functional Deficits. [] Progressing: [] Met: [x] Not Met: [] Adjusted  4. Patient will return to daily functional activities without increased symptoms or restriction. [] Progressing: [] Met: [x] Not Met: [] Adjusted   5. Pt will be able to sleep through the night with increased symptoms or restrictions. [] Progressing: [] Met: [x] Not Met: [] Adjusted      ASSESSMENT: Pt w/ relief of symptoms w/ opening of L cervical facet joints and distraction. Continues to need cues for scapular recruitment and head posture to prevent excessive closing of L cervical neural facets. Felt better at end of session. Encouraged pt to work on postural endurance and correcting self when symptoms increase. Treatment/Activity Tolerance:  [x] Patient tolerated treatment well [] Patient limited by fatique  [] Patient limited by pain  [] Patient limited by other medical complications  [] Other:     Overall Progression Towards Functional goals/ Treatment Progress Update:  [x] Patient is progressing as expected towards functional goals listed. [] Progression is slowed due to complexities/Impairments listed. [] Progression has been slowed due to co-morbidities.   [] Plan just implemented, too soon to assess goals progression <30days   [] Goals require adjustment due to lack of progress  [] Patient is not progressing as expected and requires additional follow up with physician  [] Other    Prognosis for POC: [x] Good [] Fair  [] Poor    Patient requires continued skilled intervention: [x] Yes  [] No      PLAN: 1x/week x4 weeks  [x] Continue per plan of care [] Gian Schuler current plan (see comments)  [] Plan of care initiated [] Hold pending MD visit [] Discharge    Electronically signed by: Ernestine Royal PT     Note: If patient does not return for scheduled/recommended follow up visits, this note will serve as a discharge from care along with the most recent update on progress.

## 2021-03-23 ENCOUNTER — HOSPITAL ENCOUNTER (OUTPATIENT)
Dept: PHYSICAL THERAPY | Age: 51
Setting detail: THERAPIES SERIES
Discharge: HOME | End: 2021-03-23
Payer: COMMERCIAL

## 2021-03-23 PROCEDURE — 97140 MANUAL THERAPY 1/> REGIONS: CPT

## 2021-03-23 PROCEDURE — 97530 THERAPEUTIC ACTIVITIES: CPT

## 2021-03-23 PROCEDURE — 97112 NEUROMUSCULAR REEDUCATION: CPT

## 2021-03-23 NOTE — FLOWSHEET NOTE
[place] , [employer]    Physical Therapy Treatment Note/ Progress Report:     Date:  3/23/2021    Patient Name:  Janice Mckeon    :  1970  MRN: [id number]  Medical/Treatment Diagnosis Information:  · Diagnosis: M54.12 cervical radiculopathy; M47.812 cervical spondylosis without myelopathy; M50.30 DDD, cervical  · Treatment Diagnosis: D08.8  Insurance/Certification information:  PT Insurance Information: Javon Ferrari; 30 visits; [id number] not covered  Physician Information:  Referring Practitioner: GIANLUCA Amaro  Plan of care signed (Y/N): y    Date of Patient follow up with Physician:      Progress Report: []  Yes  [x]  No     Functional Scale: NDI: 46%   Date: 3/9/2021    Date Range for reporting period:  Beginnin2021  Ending:      Progress report due (10 Rx/or 30 days whichever is less):      Recertification due (POC duration/ or 90 days whichever is less): 2021     Visit # Insurance Allowable Auth Needed   ; 09511 not covered []Yes    [x]No     Pain level: 3/10     SUBJECTIVE:  Pt states his (L) UE are still intermittent at times and that he generally gets headaches 2-3x/day. Pt feels therapy is continuing to help overall and that he is able to sleep most of the night. MRI Results (from report)  Disc and osteophytes result in moderate narrowing of the neural foramina   bilaterally at C5-6.       Mild narrowing of the neural foramina at C3-4 and C4-5.       No stenosis of the thecal sac in the cervical region.        OBJECTIVE: 3/23/2021   Observation:     Hypomobility throughout upper/mid thoracic spine with spring testing   Test measurements:      (R) rotation: 25% loss pre manual       Normal mobility post manual    (+) Cervicoflexion-rotation test: pain and restriction with (R) rotation    ULNTT (L):  (-)    RESTRICTIONS/PRECAUTIONS: n/a    Exercises/Interventions:   Therapeutic Ex Wt / Resistance Sets/sec Reps Notes   TB rows green 2 10    No money yellow 2 10 Cues for posture                     Therapeutic Activities                     Manual Intervention- 30       Shld /GH Mobs       Post Cap mobs       Thoracic/Rib manipulation     CT MT/Mobs 5'    manual distraction    STM 5   L SCM/scalenes   Cervical mobilization (L) C1/C2     (L)  Gr II/III     Prone thoracic mobilization  CPA (L) T1-T3   Gr III/IV    Seated C1-C2 Rotational Mob Mid range/Gentle 4' (B)                                NMR re-education- 10       Open books  1 10 bilat   Thoracic extension    Over towel roll held 1/29   Supine cervical retraction  5'' 3  Stopped after 3 reps due to increase in UE N/T 3/23   Cervical SNAG for L cervical rotation  1 10 HEP Held   Seated Cerv Retraction W/ and w/o scap retraction 5\" 10 Added 3/23 able to perform without increased UE symptoms       Therapeutic Exercise and NMR EXR  [x] (23163) Provided verbal/tactile cueing for activities related to strengthening, flexibility, endurance, ROM  for improvements in scapular, scapulothoracic and UE control with self care, reaching, carrying, lifting, house/yardwork, driving/computer work. [x] (78298) Provided verbal/tactile cueing for activities related to improving balance, coordination, kinesthetic sense, posture, motor skill, proprioception  to assist with  scapular, scapulothoracic and UE control with self care, reaching, carrying, lifting, house/yardwork, driving/computer work. Therapeutic Activities:    [] (04994 or 41324) Provided verbal/tactile cueing for activities related to improving balance, coordination, kinesthetic sense, posture, motor skill, proprioception and motor activation to allow for proper function of scapular, scapulothoracic and UE control with self care, carrying, lifting, driving/computer work.      Home Exercise Program:    [x] (37573) Reviewed/Progressed HEP activities related to strengthening, flexibility, endurance, ROM of scapular, additional follow up with physician  [] Other    Prognosis for POC: [x] Good [] Fair  [] Poor    Patient requires continued skilled intervention: [x] Yes  [] No      PLAN: 1x/week x4 weeks  [x] Continue per plan of care [] Alter current plan (see comments)  [] Plan of care initiated [] Hold pending MD visit [] Discharge    Electronically signed by: Ziggy Alejandra PT     Note: If patient does not return for scheduled/recommended follow up visits, this note will serve as a discharge from care along with the most recent update on progress.

## 2021-04-02 ENCOUNTER — HOSPITAL ENCOUNTER (OUTPATIENT)
Dept: PHYSICAL THERAPY | Age: 51
Setting detail: THERAPIES SERIES
Discharge: HOME | End: 2021-04-02
Payer: COMMERCIAL

## 2021-04-12 ENCOUNTER — OFFICE VISIT (OUTPATIENT)
Dept: FAMILY MEDICINE CLINIC | Age: 51
End: 2021-04-12
Payer: COMMERCIAL

## 2021-04-12 VITALS
SYSTOLIC BLOOD PRESSURE: 126 MMHG | DIASTOLIC BLOOD PRESSURE: 84 MMHG | BODY MASS INDEX: 28.67 KG/M2 | WEIGHT: 211.4 LBS | HEART RATE: 77 BPM | OXYGEN SATURATION: 98 %

## 2021-04-12 DIAGNOSIS — E11.9 TYPE 2 DIABETES MELLITUS WITHOUT COMPLICATION, WITHOUT LONG-TERM CURRENT USE OF INSULIN (HCC): Primary | ICD-10-CM

## 2021-04-12 DIAGNOSIS — E78.2 MIXED HYPERLIPIDEMIA: ICD-10-CM

## 2021-04-12 DIAGNOSIS — M1A.0790 CHRONIC GOUT OF ANKLE, UNSPECIFIED CAUSE, UNSPECIFIED LATERALITY: ICD-10-CM

## 2021-04-12 DIAGNOSIS — M50.30 DEGENERATION OF CERVICAL INTERVERTEBRAL DISC: ICD-10-CM

## 2021-04-12 LAB — HBA1C MFR BLD: 6.5 %

## 2021-04-12 PROCEDURE — 3044F HG A1C LEVEL LT 7.0%: CPT | Performed by: FAMILY MEDICINE

## 2021-04-12 PROCEDURE — 3017F COLORECTAL CA SCREEN DOC REV: CPT | Performed by: FAMILY MEDICINE

## 2021-04-12 PROCEDURE — 1036F TOBACCO NON-USER: CPT | Performed by: FAMILY MEDICINE

## 2021-04-12 PROCEDURE — G8417 CALC BMI ABV UP PARAM F/U: HCPCS | Performed by: FAMILY MEDICINE

## 2021-04-12 PROCEDURE — 99214 OFFICE O/P EST MOD 30 MIN: CPT | Performed by: FAMILY MEDICINE

## 2021-04-12 PROCEDURE — G8427 DOCREV CUR MEDS BY ELIG CLIN: HCPCS | Performed by: FAMILY MEDICINE

## 2021-04-12 PROCEDURE — 83036 HEMOGLOBIN GLYCOSYLATED A1C: CPT | Performed by: FAMILY MEDICINE

## 2021-04-12 PROCEDURE — 2022F DILAT RTA XM EVC RTNOPTHY: CPT | Performed by: FAMILY MEDICINE

## 2021-04-12 RX ORDER — CYCLOBENZAPRINE HCL 10 MG
10 TABLET ORAL NIGHTLY PRN
Qty: 30 TABLET | Refills: 1 | Status: SHIPPED | OUTPATIENT
Start: 2021-04-12 | End: 2021-06-14

## 2021-04-12 NOTE — PROGRESS NOTES
Chief Complaint: Diabetes       HPI:  Angelic Blank is a 45 y.o. male here for the follow-up of type 2 diabetes his A1c has been stable he has been taking Metformin 500 mg twice a day. Denies any hypoglycemic symptoms  However he has not checked his BGM at home  His A1c here is 6.5  Denies any side effects from the medication    He has hyperlipidemia and has been taking Lipitor 40 mg daily    He has cervical neck degeneration for which he has been following up with Ortho and has been getting epidural injection  He request for refill of the Flexeril    His gout has been stable    ROS:  Constitutional: Negative  Respiratory: Negative for cough, chest tightness, shortness of breath and wheezing. Cardiovascular: Negative for chest pain, palpitations and leg swelling. Gastrointestinal: Negative for abdominal pain, blood in stool, constipation, diarrhea, nausea and vomiting. Genitourinary: Negative   Musculoskeletal: As mentioned above  Skin: Negative for color change, pallor, rash and wound. Neurological: Negative  Psychiatric/Behavioral: Negative     Patient's problem list, medications, allergies, past medical, surgical, social and family histories were reviewed and updated as appropriate.      Current Outpatient Medications   Medication Sig Dispense Refill    Blood Glucose Monitoring Suppl KIT 1 kit by Does not apply route daily Dispense according to insurance formulary 1 kit 0    blood glucose test strips (ASCENSIA AUTODISC VI;ONE TOUCH ULTRA TEST VI) strip 1 each by In Vitro route daily Test as directed,Dispense according to insurance formulary 100 each 2    cyclobenzaprine (FLEXERIL) 10 MG tablet Take 1 tablet by mouth nightly as needed for Muscle spasms 30 tablet 1    meloxicam (MOBIC) 15 MG tablet Take 1 tablet by mouth daily 30 tablet 2    allopurinol (ZYLOPRIM) 100 MG tablet TAKE 1 TABLET BY MOUTH TWICE A  tablet 1    metFORMIN (GLUCOPHAGE) 500 MG tablet TAKE 1 TABLET BY MOUTH TWICE A DAY WITH MEALS 180 tablet 1    Lancets MISC 1 each by Does not apply route daily Test as directed, Dispense according to insurance formulary 100 each 2    latanoprost (XALATAN) 0.005 % ophthalmic solution INSTILL ONE DROP IN BOTH EYES AT BEDTIME      blood glucose monitor strips Test 1 times a day & as needed for symptoms of irregular blood glucose. Dispense sufficient amount for indicated testing frequency plus additional to accommodate PRN testing needs. 100 strip 1    Blood Glucose Monitoring Suppl (D-CARE GLUCOMETER) w/Device KIT 1 applicator by Does not apply route daily 30 kit 0    cetirizine (ZYRTEC) 10 MG tablet Take 10 mg by mouth daily      polyethyl glycol-propyl glycol 0.4-0.3 % (SYSTANE) 0.4-0.3 % ophthalmic solution 1 drop as needed for Dry Eyes      diclofenac sodium (VOLTAREN) 1 % GEL Apply 2 g topically 2 times daily      albuterol sulfate HFA (VENTOLIN HFA) 108 (90 Base) MCG/ACT inhaler Inhale 2 puffs into the lungs every 6 hours as needed for Wheezing      fluticasone (FLONASE) 50 MCG/ACT nasal spray 1 spray by Each Nostril route daily      Hypertonic Nasal Wash (SINUS RINSE NA) by Nasal route      gabapentin (NEURONTIN) 300 MG capsule Take 1 tablet nightly 30 capsule 1    atorvastatin (LIPITOR) 40 MG tablet Take 1 tablet by mouth daily 90 tablet 3    ibuprofen (ADVIL;MOTRIN) 600 MG tablet Take 1 tablet by mouth every 8 hours as needed for Pain 30 tablet 0     No current facility-administered medications for this visit. Social History     Tobacco Use    Smoking status: Never Smoker    Smokeless tobacco: Never Used   Substance Use Topics    Alcohol use: Not Currently        Objective:     Vitals:    04/12/21 1356   BP: 126/84   Pulse: 77   SpO2: 98%   Weight: 211 lb 6.4 oz (95.9 kg)     Body mass index is 28.67 kg/m².      Wt Readings from Last 3 Encounters:   04/12/21 211 lb 6.4 oz (95.9 kg)   03/02/21 205 lb (93 kg)   02/03/21 205 lb 14.6 oz (93.4 kg)     BP Readings from Last 3 Encounters:   04/12/21 126/84   10/12/20 130/74   07/13/20 (!) 150/99       Physical exam:  Constitutional: he is oriented to person, place, and time. he appears well-developed and well-nourished. No distress. Neck: Normal range of motion. No JVD present. No tracheal deviation present. No thyromegaly present. Cardiovascular: Normal rate, regular rhythm, normal heart sounds and intact distal pulses. No murmur heard. Pulmonary/Chest: Effort normal and breath sounds normal. No stridor. No respiratory distress. he has no wheezes. he has no rales. heexhibits no tenderness. Abdominal: Soft. Bowel sounds are normal. he exhibits no distension and no mass. There is no tenderness. There is no rebound and no guarding. Musculoskeletal: Normal range of motion. he exhibits no edema, tenderness or deformity. Neurological:he is alert and oriented to person, place, and time. he has gross neurological exam normal with normal strength and normal gait  Skin: Skin is warm and dry. No rash noted. he is not diaphoretic. No erythema. No pallor. Psychiatric: he has a normal mood and affect. his   behavior is normal.      Assessment/Plan:   1. Type 2 diabetes mellitus without complication, without long-term current use of insulin ([facility])  Continue the same  -  DIABETES FOOT EXAM  - POCT glycosylated hemoglobin (Hb A1C)  - Blood Glucose Monitoring Suppl KIT; 1 kit by Does not apply route daily Dispense according to insurance formulary  Dispense: 1 kit; Refill: 0  - blood glucose test strips (ASCENSIA AUTODISC VI;ONE TOUCH ULTRA TEST VI) strip; 1 each by In Vitro route daily Test as directed,Dispense according to insurance formulary  Dispense: 100 each; Refill: 2  - CBC Auto Differential; Future  - Basic Metabolic Panel; Future  - Lipid Panel; Future  - Hemoglobin A1C; Future  - MICROALBUMIN / CREATININE URINE RATIO; Future    2. Mixed hyperlipidemia  Continue the same and repeat the lipid profile    3.  Degeneration of cervical intervertebral disc  refilled  - cyclobenzaprine (FLEXERIL) 10 MG tablet; Take 1 tablet by mouth nightly as needed for Muscle spasms  Dispense: 30 tablet; Refill: 1    4.  Chronic gout of ankle, unspecified cause, unspecified laterality  stable           Robert Phoenix  4/12/2021 4:37 PM

## 2021-04-19 ENCOUNTER — APPOINTMENT (OUTPATIENT)
Dept: PHYSICAL THERAPY | Age: 51
End: 2021-04-19
Payer: COMMERCIAL

## 2021-04-21 ENCOUNTER — HOSPITAL ENCOUNTER (OUTPATIENT)
Dept: PHYSICAL THERAPY | Age: 51
Setting detail: THERAPIES SERIES
Discharge: HOME | End: 2021-04-21
Payer: COMMERCIAL

## 2021-04-21 PROCEDURE — 97110 THERAPEUTIC EXERCISES: CPT

## 2021-04-21 PROCEDURE — 97140 MANUAL THERAPY 1/> REGIONS: CPT

## 2021-04-21 NOTE — PLAN OF CARE
[facility]     Physical Therapy Re-Certification Plan of Care    Dear  Abram Santos,    We had the pleasure of treating the following patient for physical therapy services at [address] [place] [address]. A summary of our findings can be found in the updated assessment below. This includes our plan of care. If you have any questions or concerns regarding these findings, please do not hesitate to contact me at the office phone number checked above. Thank you for the referral.     Physician Signature:________________________________Date:__________________  By signing above (or electronic signature), therapists plan is approved by physician      Functional Outcome: NDI 34% LOF    Overall Response to Treatment:   []Patient is responding well to treatment and improvement is noted with regards  to goals   []Patient should continue to improve in reasonable time if they continue HEP   []Patient has plateaued and is no longer responding to skilled PT intervention    []Patient is getting worse and would benefit from return to referring MD   []Patient unable to adhere to initial POC   [x]Other: Pt has been out of therapy for the past month due to a stomach virus/food poisoning. Pt reports 60% progress overall. Pt wishes to continue therapy at this time. Date range of Visits: 2021-2021  Total Visits: 6    Recommendation:    [x]Continue PT 1x / wk for 4 weeks.     []Hold PT, pending MD visit      Date:  2021    Patient Name:  Ayde Peña    :  1970  MRN: [id number]  Medical/Treatment Diagnosis Information:  · Diagnosis: M54.12 cervical radiculopathy; M47.812 cervical spondylosis without myelopathy; M50.30 DDD, cervical  · Treatment Diagnosis: K73.6  Insurance/Certification information:  PT Insurance Information: [facility]; 30 visits; [id number] not covered  Physician Information:  Referring Practitioner: Omkar Darnell scap    RESTRICTIONS/PRECAUTIONS: n/a    Exercises/Interventions:   Therapeutic Ex x 12' Wt / Resistance Sets/sec Reps Notes   TB rows green 2 10    No money yellow 2 10 Cues for posture    TB Extension green 2 10 Added 4/21             Therapeutic Activities                     Manual Intervention- 25       Shld /GH Mobs       Post Cap mobs       Thoracic/Rib manipulation     CT MT/Mobs 5'    manual distraction    IASTM 10'   (L) upper trap, levator trap   Cervical mobilization (L) C1/C2     (L)  Gr II/III     Prone thoracic mobilization 15' CPA (L) T1-T3    Rotational Mobs (R) T9-T10   Gr III/IV    Seated C1-C2 Rotational Mob Mid range/Gentle 4' (B)                                NMR re-education- 5       Open books  1 10 bilat   Thoracic extension    Over towel roll held 1/29   Supine cervical retraction  5'' 3  Stopped after 3 reps due to increase in UE N/T 3/23   Cervical SNAG for L cervical rotation  1 10 HEP Held   Seated Cerv Retraction W/ and w/o scap retraction 5\" 10 Added 3/23 able to perform without increased UE symptoms       Therapeutic Exercise and NMR EXR  [x] (97451) Provided verbal/tactile cueing for activities related to strengthening, flexibility, endurance, ROM  for improvements in scapular, scapulothoracic and UE control with self care, reaching, carrying, lifting, house/yardwork, driving/computer work. [x] (57810) Provided verbal/tactile cueing for activities related to improving balance, coordination, kinesthetic sense, posture, motor skill, proprioception  to assist with  scapular, scapulothoracic and UE control with self care, reaching, carrying, lifting, house/yardwork, driving/computer work.     Therapeutic Activities:    [] (81931 or 94053) Provided verbal/tactile cueing for activities related to improving balance, coordination, kinesthetic sense, posture, motor skill, proprioception and motor activation to allow for proper function of scapular, scapulothoracic and UE control with self care, carrying, lifting, driving/computer work. Home Exercise Program:    [x] (79113) Reviewed/Progressed HEP activities related to strengthening, flexibility, endurance, ROM of scapular, scapulothoracic and UE control with self care, reaching, carrying, lifting, house/yardwork, driving/computer work  [] (20334) Reviewed/Progressed HEP activities related to improving balance, coordination, kinesthetic sense, posture, motor skill, proprioception of scapular, scapulothoracic and UE control with self care, reaching, carrying, lifting, house/yardwork, driving/computer work      Manual Treatments:  PROM / STM / Oscillations-Mobs:  G-I, II, III, IV (PA's, Inf., Post.)  [x] (90826) Provided manual therapy to mobilize soft tissue/joints of cervical/CT, scapular GHJ and UE for the purpose of modulating pain, promoting relaxation,  increasing ROM, reducing/eliminating soft tissue swelling/inflammation/restriction, improving soft tissue extensibility and allowing for proper ROM for normal function with self care, reaching, carrying, lifting, house/yardwork, driving/computer work    Modalities:      Charges:  Timed Code Treatment Minutes: 42   Total Treatment Minutes: 45       [] EVAL (LOW) 25615 (typically 20 minutes face-to-face)  [] EVAL (MOD) 64583 (typically 30 minutes face-to-face)  [] EVAL (HIGH) 90337 (typically 45 minutes face-to-face)  [] RE-EVAL     [x] GJ(36990) x  1   [] IONTO (74860)  [] NMR (73437) x 1     [] VASO (43651)  [x] Manual (40860) x 2    [] Other:  [] TA (81593)x     [] Mech Traction (66378)  [] ES(attended) (38001)     [] ES (un) (42546):         GOALS:  Patient stated goal: \"get rid of pain and be able to sleep better\"  [] Progressing: [] Met: [x] Not Met: [] Adjusted    Therapist goals for Patient:   Short Term Goals: To be achieved in: 2 weeks  1. Independent in HEP and progression per patient tolerance, in order to prevent re-injury. [] Progressing: [x] Met: [] Not Met: [] Adjusted    2.

## 2021-04-28 ENCOUNTER — HOSPITAL ENCOUNTER (OUTPATIENT)
Dept: PHYSICAL THERAPY | Age: 51
Setting detail: THERAPIES SERIES
Discharge: HOME | End: 2021-04-28
Payer: COMMERCIAL

## 2021-04-28 NOTE — FLOWSHEET NOTE
[facility]    Physical Therapy  Cancellation/No-show Note  Patient Name:  Barbara Lopez  :  1970   Date:  2021  Cancelled visits to date: 1  No-shows to date: 11    For today's appointment patient:  []  Cancelled  []  Rescheduled appointment  [x]  No-show     Reason given by patient:  []  Patient ill  []  Conflicting appointment  []  No transportation    []  Conflict with work  [x]  No reason given  []  Other:     Comments:      Phone call information:   [x]  Phone call made today to patient at _ time at number provided:      []  Patient answered, conversation as follows:    [x]  Patient did not answer, message left as follows: Call patient indicating missed visit and reminding patient of next scheduled appointment. Provided call back number and instructed pt to call back clinic to confirm upcoming appointment. []  Phone call not made today  []  Phone call not needed - pt contacted us to cancel and provided reason for cancellation.      Electronically signed by:  Cayden Donald, PT

## 2021-05-04 ENCOUNTER — HOSPITAL ENCOUNTER (OUTPATIENT)
Dept: PHYSICAL THERAPY | Age: 51
Setting detail: THERAPIES SERIES
Discharge: HOME | End: 2021-05-04
Payer: COMMERCIAL

## 2021-05-04 NOTE — FLOWSHEET NOTE
[facility]    Physical Therapy  Cancellation/No-show Note  Patient Name:  Rena Kruger  :  1970   Date:  2021  Cancelled visits to date: 2  No-shows to date: 11    For today's appointment patient:  [x]  Cancelled  []  Rescheduled appointment  []  No-show     Reason given by patient:  []  Patient ill  []  Conflicting appointment  []  No transportation    []  Conflict with work  []  No reason given  [x]  Other:     Comments: Pt thought his appointment was tomorrow. Pt confirmed for his appointment next . Phone call information:   []  Phone call made today to patient at _ time at number provided:      []  Patient answered, conversation as follows:    []  Patient did not answer, message left as follows: Call patient indicating missed visit and reminding patient of next scheduled appointment. Provided call back number and instructed pt to call back clinic to confirm upcoming appointment. []  Phone call not made today  [x]  Phone call not needed - pt contacted us to cancel and provided reason for cancellation.      Electronically signed by:  Anna Nam, PT

## 2021-05-05 DIAGNOSIS — E11.9 TYPE 2 DIABETES MELLITUS WITHOUT COMPLICATION, WITHOUT LONG-TERM CURRENT USE OF INSULIN (HCC): ICD-10-CM

## 2021-05-05 NOTE — TELEPHONE ENCOUNTER
Medication:   Requested Prescriptions     Pending Prescriptions Disp Refills    metFORMIN (GLUCOPHAGE) 500 MG tablet [Pharmacy Med Name: METFORMIN  MG TABLET] 180 tablet 1     Sig: TAKE 1 TABLET BY MOUTH TWICE A DAY WITH MEALS       Last Filled:  10/30/2020 #180 Refills 1    Patient Phone Number: [phone] (home)     Last appt: 4/12/2021   Next appt: 10/12/2021    Last Labs DM:   Lab Results   Component Value Date    LABA1C 6.5 04/12/2021

## 2021-05-12 ENCOUNTER — HOSPITAL ENCOUNTER (OUTPATIENT)
Dept: PHYSICAL THERAPY | Age: 51
Setting detail: THERAPIES SERIES
Discharge: HOME | End: 2021-05-12
Payer: COMMERCIAL

## 2021-05-12 PROCEDURE — 97140 MANUAL THERAPY 1/> REGIONS: CPT

## 2021-05-12 PROCEDURE — 97110 THERAPEUTIC EXERCISES: CPT

## 2021-05-12 NOTE — FLOWSHEET NOTE
[facility]     Physical Therapy Daily Treatment Note          Date:  2021    Patient Name:  Stefanie Gonsalez    :  1970  MRN: [id number]  Medical/Treatment Diagnosis Information:  · Diagnosis: M54.12 cervical radiculopathy; M47.812 cervical spondylosis without myelopathy; M50.30 DDD, cervical  · Treatment Diagnosis: I01.9  Insurance/Certification information:  PT Insurance Information: [employer]; 30 visits; [id number] not covered  Physician Information:  Referring Practitioner: GIANLUCA Londono/ [facility] signed (Y/N): y    Date of Patient follow up with Physician:      Progress Report: []  Yes  [x]  No     Functional Scale: NDI: 34%   Date: 2021    Date Range for reporting period:  Beginnin2021  Endin2021    Progress report due (10 Rx/or 30 days whichever is less):     Recertification due (POC duration/ or 90 days whichever is less): 2021    Visit # Insurance Allowable Auth Needed    not covered []Yes    [x]No     Pain level: 1-2/10 currently     SUBJECTIVE:  Pt was last seen on  and has missed his last two appointments. Pt notes he is not feeling much pain right now. Pt notes he more feels stiffness around his neck and has had increased back pain. Pt states he has been very busy and subsequently very tired. Pt does state he feels he has had some muscle spasms in the (L) side of his neck for the first time yesterday. Pt states he has been compliant with his HEP.           OBJECTIVE: 21   Observation:     Excessive upper T/S kyphosis    CERV ROM        Cervical Flexion  50 degdeg     Cervical Extension 34 deg       Left Right   Cervical SB       Cervical rotation 72 78       UE :Myotomes, Dermatomes, Reflexes: Symmetrical (B)        Palpation: hypomobility upper and mid T/S   Trigger points noted (L) upper trap, restrictions (L) levator scap    RESTRICTIONS/PRECAUTIONS: n/a    Exercises/Interventions:   Therapeutic Ex x 14' Wt / Resistance Sets/sec Reps Notes   TB rows green 2 10    No money yellow 2 10 Cues for posture    TB Extension green 2 10 Added 4/21   Thoracic Extension over chair  5\" 10 Added 5/12   Standing Pec Stretch doorway 30\" 3 Added 5/12                                  Therapeutic Activities                     Manual Intervention- 25       Shld /GH Mobs       Post Cap mobs       Thoracic/Rib manipulation     CT MT/Mobs 5'    manual distraction    IASTM 10'   (L) upper trap, levator trap   Cervical mobilization (L) C1/C2     (L)  Gr II/III     Prone thoracic mobilization 10' CPA (L) C7-T3    R   Gr III/IV    Seated SNAG thoracic/C/S Extension Mid to end range 5'                                 NMR re-education- 5       Open books  1 10 bilat   Thoracic extension    Over towel roll held 1/29   Prone Scap retraction  3\" 12 Added 5/12   Cervical SNAG for L cervical rotation  1 10 HEP Held   Seated Cerv Retraction W/ and w/o scap retraction 5\" 10 Added 3/23 able to perform without increased UE symptoms     Access Code: [email or web address]  URL: [email or web address]  Date: 04/21/2021  Prepared by: Rubén Del Rio    Exercises  Standing Bilateral Low Shoulder Row with Anchored Resistance - 1 x daily - 3 x weekly - 3 sets - 10 reps  Standing Shoulder Extension with Resistance - 1 x daily - 3 x weekly - 3 sets - 10 reps  Standing Shoulder External Rotation with Resistance - 1 x daily - 3 x weekly - 3 sets - 10 reps  Seated Thoracic Lumbar Extension - 1-2 x daily - 7 x weekly - 2 sets - 12 reps  Sidelying Thoracic Rotation with Open Book - 1 x daily - 7 x weekly - 3 sets - 12 reps      Therapeutic Exercise and NMR EXR  [x] (12973) Provided verbal/tactile cueing for activities related to strengthening, flexibility, endurance, ROM  for improvements in scapular, scapulothoracic and UE control with self care, reaching, carrying, lifting, to be compliant in order to benefit. Significant upper thoracic kyphosis and associated segmental hypomibility of the spine continue to limit cervical extension. Pain well controlled throughout today's session. Treatment/Activity Tolerance:  [x] Patient tolerated treatment well [] Patient limited by fatique  [] Patient limited by pain  [] Patient limited by other medical complications  [] Other:     Overall Progression Towards Functional goals/ Treatment Progress Update:  [x] Patient is progressing as expected towards functional goals listed. [] Progression is slowed due to complexities/Impairments listed. [] Progression has been slowed due to co-morbidities. [] Plan just implemented, too soon to assess goals progression <30days   [] Goals require adjustment due to lack of progress  [] Patient is not progressing as expected and requires additional follow up with physician  [] Other    Prognosis for POC: [x] Good [] Fair  [] Poor    Patient requires continued skilled intervention: [x] Yes  [] No      PLAN: 1x/week x4 weeks  [x] Continue per plan of care [] Alter current plan (see comments)  [] Plan of care initiated [] Hold pending MD visit [] Discharge    Electronically signed by: Geri Pratt PT     Note: If patient does not return for scheduled/recommended follow up visits, this note will serve as a discharge from care along with the most recent update on progress.

## 2021-05-19 ENCOUNTER — HOSPITAL ENCOUNTER (OUTPATIENT)
Dept: PHYSICAL THERAPY | Age: 51
Setting detail: THERAPIES SERIES
Discharge: HOME | End: 2021-05-19
Payer: COMMERCIAL

## 2021-05-19 NOTE — FLOWSHEET NOTE
[facility]    Physical Therapy  Cancellation/No-show Note  Patient Name:  Amadou Barnhart  :  1970   Date:  2021  Cancelled visits to date: 3  No-shows to date: 11    For today's appointment patient:  [x]  Cancelled  []  Rescheduled appointment  []  No-show     Reason given by patient:  []  Patient ill  []  Conflicting appointment  []  No transportation    []  Conflict with work  []  No reason given  [x]  Other:     Comments: Pt had a recent procedure performed and was instructed to hold on therapy. Phone call information:   []  Phone call made today to patient at _ time at number provided:      []  Patient answered, conversation as follows:    []  Patient did not answer, message left as follows:   []  Phone call not made today  [x]  Phone call not needed - pt contacted us to cancel and provided reason for cancellation.      Electronically signed by:  Rashad Caba PT, PT

## 2021-05-28 NOTE — FLOWSHEET NOTE
[facility]    Physical Therapy  Cancellation/No-show Note  Patient Name:  Armando Birch  :  1970   Date:  2021  Cancelled visits to date: 1  No-shows to date: 3    For today's appointment patient:  []  Cancelled  []  Rescheduled appointment  [x]  No-show     Reason given by patient:  []  Patient ill  []  Conflicting appointment  []  No transportation    []  Conflict with work  [x]  No reason given  []  Other:     Comments:      Phone call information:   []  Phone call made today to patient at _ time at number provided:      []  Patient answered, conversation as follows:    []  Patient did not answer, message left as follows:  [x]  Phone call not made today  []  Phone call not needed - pt contacted us to cancel and provided reason for cancellation.      Electronically signed by: Esme Walker, SPT Emily Davis, PT
yes

## 2021-06-13 DIAGNOSIS — M50.30 DEGENERATION OF CERVICAL INTERVERTEBRAL DISC: ICD-10-CM

## 2021-06-14 DIAGNOSIS — E11.9 TYPE 2 DIABETES MELLITUS WITHOUT COMPLICATION, WITHOUT LONG-TERM CURRENT USE OF INSULIN (HCC): ICD-10-CM

## 2021-06-14 RX ORDER — CYCLOBENZAPRINE HCL 10 MG
10 TABLET ORAL NIGHTLY PRN
Qty: 30 TABLET | Refills: 1 | Status: SHIPPED | OUTPATIENT
Start: 2021-06-14 | End: 2021-07-22

## 2021-06-14 RX ORDER — BLOOD-GLUCOSE METER
EACH MISCELLANEOUS
Qty: 1 KIT | Refills: 0 | Status: SHIPPED | OUTPATIENT
Start: 2021-06-14

## 2021-06-14 NOTE — TELEPHONE ENCOUNTER
Medication:   Requested Prescriptions     Pending Prescriptions Disp Refills    Blood Glucose Monitoring Suppl (ONE TOUCH ULTRA 2) w/Device KIT [Pharmacy Med Name: TERESA Moeller GLUCOSE SYST] 1 kit      Sig: USE AS DIRECTED DAILY        Last Filled:  4/12/2021 1 kit 0 refills     Patient Phone Number: [phone] (home)     Last appt: 4/12/2021   Next appt: 10/12/2021    Last OARRS: No flowsheet data found.

## 2021-06-14 NOTE — TELEPHONE ENCOUNTER
Medication:   Requested Prescriptions     Pending Prescriptions Disp Refills    cyclobenzaprine (FLEXERIL) 10 MG tablet [Pharmacy Med Name: CYCLOBENZAPRINE 10 MG TABLET] 30 tablet 1     Sig: TAKE 1 TABLET BY MOUTH NIGHTLY AS NEEDED FOR MUSCLE SPASMS        Last Filled:  4/12/2021 #30 Refills 1    Patient Phone Number: [phone] (home)     Last appt: 4/12/2021   Next appt: 10/12/2021    Last OARRS: No flowsheet data found.

## 2021-06-23 RX ORDER — MELOXICAM 15 MG/1
TABLET ORAL
Qty: 30 TABLET | Refills: 1 | Status: SHIPPED | OUTPATIENT
Start: 2021-06-23 | End: 2021-08-16

## 2021-07-22 ENCOUNTER — NURSE TRIAGE (OUTPATIENT)
Dept: OTHER | Facility: CLINIC | Age: 51
End: 2021-07-22

## 2021-07-22 ENCOUNTER — OFFICE VISIT (OUTPATIENT)
Dept: FAMILY MEDICINE CLINIC | Age: 51
End: 2021-07-22
Payer: COMMERCIAL

## 2021-07-22 VITALS
DIASTOLIC BLOOD PRESSURE: 78 MMHG | BODY MASS INDEX: 28.62 KG/M2 | SYSTOLIC BLOOD PRESSURE: 118 MMHG | WEIGHT: 211 LBS | HEART RATE: 65 BPM | OXYGEN SATURATION: 97 %

## 2021-07-22 DIAGNOSIS — M50.90 CERVICAL DISC DISEASE: Primary | ICD-10-CM

## 2021-07-22 DIAGNOSIS — E11.9 TYPE 2 DIABETES MELLITUS WITHOUT COMPLICATION, WITHOUT LONG-TERM CURRENT USE OF INSULIN (HCC): ICD-10-CM

## 2021-07-22 PROCEDURE — 3044F HG A1C LEVEL LT 7.0%: CPT | Performed by: FAMILY MEDICINE

## 2021-07-22 PROCEDURE — G8427 DOCREV CUR MEDS BY ELIG CLIN: HCPCS | Performed by: FAMILY MEDICINE

## 2021-07-22 PROCEDURE — 3017F COLORECTAL CA SCREEN DOC REV: CPT | Performed by: FAMILY MEDICINE

## 2021-07-22 PROCEDURE — 99213 OFFICE O/P EST LOW 20 MIN: CPT | Performed by: FAMILY MEDICINE

## 2021-07-22 PROCEDURE — 2022F DILAT RTA XM EVC RTNOPTHY: CPT | Performed by: FAMILY MEDICINE

## 2021-07-22 PROCEDURE — G8417 CALC BMI ABV UP PARAM F/U: HCPCS | Performed by: FAMILY MEDICINE

## 2021-07-22 PROCEDURE — 1036F TOBACCO NON-USER: CPT | Performed by: FAMILY MEDICINE

## 2021-07-22 RX ORDER — BRIMONIDINE TARTRATE 2 MG/ML
SOLUTION/ DROPS OPHTHALMIC
COMMUNITY
Start: 2021-07-15

## 2021-07-22 RX ORDER — METHYLPREDNISOLONE 4 MG/1
TABLET ORAL
Qty: 21 TABLET | Refills: 0 | Status: SHIPPED | OUTPATIENT
Start: 2021-07-22 | End: 2021-07-28

## 2021-07-22 RX ORDER — CYCLOBENZAPRINE HCL 10 MG
10 TABLET ORAL 3 TIMES DAILY PRN
Qty: 30 TABLET | Refills: 0 | Status: SHIPPED | OUTPATIENT
Start: 2021-07-22 | End: 2021-08-01

## 2021-07-22 RX ORDER — EPINEPHRINE 0.3 MG/.3ML
INJECTION SUBCUTANEOUS
COMMUNITY
Start: 2021-05-28

## 2021-07-22 SDOH — ECONOMIC STABILITY: FOOD INSECURITY: WITHIN THE PAST 12 MONTHS, YOU WORRIED THAT YOUR FOOD WOULD RUN OUT BEFORE YOU GOT MONEY TO BUY MORE.: NEVER TRUE

## 2021-07-22 SDOH — ECONOMIC STABILITY: FOOD INSECURITY: WITHIN THE PAST 12 MONTHS, THE FOOD YOU BOUGHT JUST DIDN'T LAST AND YOU DIDN'T HAVE MONEY TO GET MORE.: NEVER TRUE

## 2021-07-22 ASSESSMENT — SOCIAL DETERMINANTS OF HEALTH (SDOH): HOW HARD IS IT FOR YOU TO PAY FOR THE VERY BASICS LIKE FOOD, HOUSING, MEDICAL CARE, AND HEATING?: NOT HARD AT ALL

## 2021-07-22 ASSESSMENT — PATIENT HEALTH QUESTIONNAIRE - PHQ9
SUM OF ALL RESPONSES TO PHQ9 QUESTIONS 1 & 2: 2
SUM OF ALL RESPONSES TO PHQ QUESTIONS 1-9: 2
2. FEELING DOWN, DEPRESSED OR HOPELESS: 1
1. LITTLE INTEREST OR PLEASURE IN DOING THINGS: 1

## 2021-07-22 NOTE — PROGRESS NOTES
Chief Complaint: Hand Pain (left hand pain, numbness and tingling x week.)       HPI:  Marilin Brand is a 45 y.o. male here with c/o tingling in his left hand mostly situated little finger and ring finger  It is shooting type of pain  He has history of cervical disc degeneration. He had epidural injection with Dr. Elva Vega and that has helped  It was 6 months ago. Currently he denies any injury which could have flared up his symptoms      History of type 2 diabetes  His A1c is 6.5  He is on Metformin 1000 mg twice daily and has been stable denies any concern. ROS:  Constitutional: Negative  HENT: Negative  Respiratory: Negative for cough, chest tightness, shortness of breath and wheezing. Cardiovascular: Negative for chest pain, palpitations and leg swelling. Gastrointestinal: Negative  Genitourinary: Negative   Musculoskeletal: As mentioned above    Patient's problem list, medications, allergies, past medical, surgical, social and family histories were reviewed and updated as appropriate. Current Outpatient Medications   Medication Sig Dispense Refill    brimonidine (ALPHAGAN) 0.2 % ophthalmic solution INSTILL 1 DROP IN EYE(S) THREE TIMES A DAY      EPINEPHrine (EPIPEN) 0.3 MG/0.3ML SOAJ injection INJECT 1 INJECTION (0.3 ML) INTRAMUSCULAR  AS NEEDED FOR SEVERE ALLERGIC REACTION (NOTE: IF SYMPTOMS PERSIST AFTER FIRST DOSE, MAY REPEAT DOSE IN 5-15 MINUTES -- MORE THAN 2 DOSES SHOULD BE ADMINISTERED ONLY UNDER MEDICAL SUPERVISION)      methylPREDNISolone (MEDROL DOSEPACK) 4 MG tablet Take by mouth.  21 tablet 0    cyclobenzaprine (FLEXERIL) 10 MG tablet Take 1 tablet by mouth 3 times daily as needed for Muscle spasms 30 tablet 0    meloxicam (MOBIC) 15 MG tablet TAKE 1 TABLET BY MOUTH EVERY DAY AS NEEDED FOR PAIN 30 tablet 1    Blood Glucose Monitoring Suppl (ONE TOUCH ULTRA 2) w/Device KIT USE AS DIRECTED DAILY 1 kit 0    metFORMIN (GLUCOPHAGE) 500 MG tablet TAKE 1 TABLET BY MOUTH TWICE A DAY WITH MEALS 180 tablet 1    blood glucose test strips (ASCENSIA AUTODISC VI;ONE TOUCH ULTRA TEST VI) strip 1 each by In Vitro route daily Test as directed,Dispense according to insurance formulary 100 each 2    allopurinol (ZYLOPRIM) 100 MG tablet TAKE 1 TABLET BY MOUTH TWICE A  tablet 1    Lancets MISC 1 each by Does not apply route daily Test as directed, Dispense according to insurance formulary 100 each 2    latanoprost (XALATAN) 0.005 % ophthalmic solution INSTILL ONE DROP IN BOTH EYES AT BEDTIME      blood glucose monitor strips Test 1 times a day & as needed for symptoms of irregular blood glucose. Dispense sufficient amount for indicated testing frequency plus additional to accommodate PRN testing needs. 100 strip 1    Blood Glucose Monitoring Suppl (D-CARE GLUCOMETER) w/Device KIT 1 applicator by Does not apply route daily 30 kit 0    cetirizine (ZYRTEC) 10 MG tablet Take 10 mg by mouth daily      polyethyl glycol-propyl glycol 0.4-0.3 % (SYSTANE) 0.4-0.3 % ophthalmic solution 1 drop as needed for Dry Eyes      diclofenac sodium (VOLTAREN) 1 % GEL Apply 2 g topically 2 times daily      albuterol sulfate HFA (VENTOLIN HFA) 108 (90 Base) MCG/ACT inhaler Inhale 2 puffs into the lungs every 6 hours as needed for Wheezing      fluticasone (FLONASE) 50 MCG/ACT nasal spray 1 spray by Each Nostril route daily      Hypertonic Nasal Wash (SINUS RINSE NA) by Nasal route      ibuprofen (ADVIL;MOTRIN) 600 MG tablet Take 1 tablet by mouth every 8 hours as needed for Pain 30 tablet 0    gabapentin (NEURONTIN) 300 MG capsule Take 1 tablet nightly 30 capsule 1    atorvastatin (LIPITOR) 40 MG tablet Take 1 tablet by mouth daily 90 tablet 3     No current facility-administered medications for this visit.        Social History     Tobacco Use    Smoking status: Never Smoker    Smokeless tobacco: Never Used   Substance Use Topics    Alcohol use: Not Currently        Objective:     Vitals:    07/22/21 1546 BP: 118/78   Site: Left Upper Arm   Position: Sitting   Cuff Size: Medium Adult   Pulse: 65   SpO2: 97%   Weight: 211 lb (95.7 kg)     Body mass index is 28.62 kg/m². Wt Readings from Last 3 Encounters:   07/22/21 211 lb (95.7 kg)   04/12/21 211 lb 6.4 oz (95.9 kg)   03/02/21 205 lb (93 kg)     BP Readings from Last 3 Encounters:   07/22/21 118/78   04/12/21 126/84   10/12/20 130/74       Physical exam:  Constitutional: he is oriented to person, place, and time. he appears well-developed and well-nourished. No distress. Neck: Normal range of motion. No JVD present. No tracheal deviation present. No thyromegaly present. Cardiovascular: Normal rate, regular rhythm, normal heart sounds and intact distal pulses. No murmur heard. Pulmonary/Chest: Effort normal and breath sounds normal. No stridor. No respiratory distress. he has no wheezes. he has no rales. heexhibits no tenderness. Abdominal: Soft. Bowel sounds are normal. he exhibits no distension and no mass. There is no tenderness. There is no rebound and no guarding. Musculoskeletal: Increased tingling and numbness in his left hand on hyperextension of the wrist.  Normal muscle strength  Assessment/Plan:   1. Cervical disc disease  His symptoms of tingling in his hand is mostly due to cervical disc degeneration  His symptoms had resolved in the past with epidural injection. So we will treat with  - methylPREDNISolone (MEDROL DOSEPACK) 4 MG tablet; Take by mouth. Dispense: 21 tablet; Refill: 0  - cyclobenzaprine (FLEXERIL) 10 MG tablet; Take 1 tablet by mouth 3 times daily as needed for Muscle spasms  Dispense: 30 tablet; Refill: 0    2.  Type 2 diabetes mellitus without complication, without long-term current use of insulin ([address]  Stable continue the Metformin         Phillip Mcarthur MD  7/22/2021 4:29 PM

## 2021-07-22 NOTE — TELEPHONE ENCOUNTER
Received call from [facility] at [facility] with Red Flag Complaint. Brief description of triage:  Patient calling for concerns for left hand pain and numbness: area between his ring finger and pinky finger. States pain is 8/10 and it is difficult to use his hand. Triage indicates for patient to go to office now. If no appointments available, go to [facility] for evaluation. Care advice provided, patient verbalizes understanding; denies any other questions or concerns; instructed to call back for any new or worsening symptoms. Writer provided warm transfer to [facility] at [facility] for appointment scheduling. Attention Provider: Thank you for allowing me to participate in the care of your patient. The patient was connected to triage in response to information provided to the [facility]. Please do not respond through this encounter as the response is not directed to a shared pool. Reason for Disposition   SEVERE pain (e.g., excruciating, unable to use hand at all)    Answer Assessment - Initial Assessment Questions  1. ONSET: \"When did the pain start? \"      Saturday or Sunday    2. LOCATION: \"Where is the pain located? \"      Left hand:  Between ring finger and pinky finger    3. PAIN: \"How bad is the pain? \" (Scale 1-10; or mild, moderate, severe)    - MILD (1-3): doesn't interfere with normal activities    - MODERATE (4-7): interferes with normal activities (e.g., work or school) or awakens from sleep    - SEVERE (8-10): excruciating pain, unable to use hand at all      Yes, sharp pain, 8/10 currently, comes and goes    4. WORK OR EXERCISE: \"Has there been any recent work or exercise that involved this part of the body? \"      No    5. CAUSE: \"What do you think is causing the pain? \"      Unsure but maybe related cervical disc issues. Was receiving injections and had previous numbness issues down his right arm    6. AGGRAVATING FACTORS: \"What makes the pain worse? \" (e.g., using

## 2021-08-16 RX ORDER — MELOXICAM 15 MG/1
15 TABLET ORAL DAILY PRN
Qty: 30 TABLET | Refills: 0 | Status: SHIPPED | OUTPATIENT
Start: 2021-08-16 | End: 2022-06-28 | Stop reason: SDUPTHER

## 2021-08-16 NOTE — TELEPHONE ENCOUNTER
Patient last seen 3/2/2021 and medication last filled 2/3/2021:    Discontinued by: Suzanne Zaragoza MD on 4/23/2021 08:45    Disp Refills Start End    meloxicam (MOBIC) 15 MG tablet (Discontinued) 30 tablet 2 2/3/2021 4/23/2021       Plan:      Consider repeat C JAME no sooner than 3 weeks C5/C6 left translaminar  Resume/progress PT cervical stabilization and traction trial therapeutic  Clinical follow-up 7 to 10 days after second C JAME if warranted he is to cancel this if his symptoms considerably improved in the interim  Consider orthopedic spine surgery consultation.   Monitor neck pain and jaw pain relative to recent procedure

## 2021-09-25 DIAGNOSIS — E11.9 TYPE 2 DIABETES MELLITUS WITHOUT COMPLICATION, WITHOUT LONG-TERM CURRENT USE OF INSULIN (HCC): ICD-10-CM

## 2021-09-27 NOTE — TELEPHONE ENCOUNTER
Medication:   Requested Prescriptions     Pending Prescriptions Disp Refills    ONETOUCH ULTRA strip [Pharmacy Med Name: ONE TOUCH ULTRA BLUE TEST STRP] 50 strip 5     Sig: USE TO TEST ONCE DAILY AS DIRECTED        Last Filled:  4/12/2021 100 each 2 refills     Patient Phone Number: [phone] (home)     Last appt: 7/22/2021   Next appt: 10/12/2021    Last OARRS: No flowsheet data found.

## 2021-09-28 RX ORDER — BLOOD SUGAR DIAGNOSTIC
STRIP MISCELLANEOUS
Qty: 50 STRIP | Refills: 5 | Status: SHIPPED | OUTPATIENT
Start: 2021-09-28 | End: 2022-03-31

## 2021-10-01 DIAGNOSIS — M1A.0790 CHRONIC GOUT OF ANKLE, UNSPECIFIED CAUSE, UNSPECIFIED LATERALITY: ICD-10-CM

## 2021-10-01 RX ORDER — ALLOPURINOL 100 MG/1
TABLET ORAL
Qty: 180 TABLET | Refills: 1 | Status: SHIPPED | OUTPATIENT
Start: 2021-10-01 | End: 2022-03-25

## 2021-10-04 RX ORDER — ATORVASTATIN CALCIUM 40 MG/1
TABLET, FILM COATED ORAL
Qty: 90 TABLET | Refills: 3 | Status: SHIPPED | OUTPATIENT
Start: 2021-10-04

## 2021-10-04 RX ORDER — MELOXICAM 15 MG/1
TABLET ORAL
Qty: 30 TABLET | Refills: 0 | OUTPATIENT
Start: 2021-10-04

## 2021-10-04 NOTE — TELEPHONE ENCOUNTER
Pharmacy asking for a refill    Last office visit 7/22/21    Next office visit 10/12/21    Last refill 7/13/20 #90 3R

## 2021-10-05 ENCOUNTER — TELEPHONE (OUTPATIENT)
Dept: FAMILY MEDICINE CLINIC | Age: 51
End: 2021-10-05

## 2021-10-05 NOTE — TELEPHONE ENCOUNTER
----- Message from Rick Worley sent at 10/5/2021 11:18 AM EDT -----  Subject: Message to Provider    QUESTIONS  Information for Provider? Patient is asking if he needs his blood work and   lab work done before the appointment on the 12th of October on 2:00pm?  ---------------------------------------------------------------------------  --------------  [address]  What is the best way for the office to contact you? OK to leave message on   voicemail  Preferred Call Back Phone Number?  [phone]  ---------------------------------------------------------------------------  --------------  SCRIPT ANSWERS  undefined

## 2021-11-02 DIAGNOSIS — E11.9 TYPE 2 DIABETES MELLITUS WITHOUT COMPLICATION, WITHOUT LONG-TERM CURRENT USE OF INSULIN (HCC): ICD-10-CM

## 2021-11-02 NOTE — TELEPHONE ENCOUNTER
Medication:   Requested Prescriptions     Pending Prescriptions Disp Refills    metFORMIN (GLUCOPHAGE) 500 MG tablet [Pharmacy Med Name: METFORMIN  MG TABLET] 180 tablet 1     Sig: TAKE 1 TABLET BY MOUTH TWICE A DAY WITH MEALS       Last Filled:  5/5/2021 #180 Refills 1    Patient Phone Number: [phone] (home)     Last appt: 7/22/2021   Next appt: Visit date not found    Last Labs DM:   Lab Results   Component Value Date    LABA1C 6.6 10/08/2021

## 2022-03-25 DIAGNOSIS — M1A.0790 CHRONIC GOUT OF ANKLE, UNSPECIFIED CAUSE, UNSPECIFIED LATERALITY: ICD-10-CM

## 2022-03-25 RX ORDER — ALLOPURINOL 100 MG/1
TABLET ORAL
Qty: 180 TABLET | Refills: 1 | Status: SHIPPED | OUTPATIENT
Start: 2022-03-25 | End: 2022-09-22

## 2022-03-25 NOTE — TELEPHONE ENCOUNTER
Medication:   Requested Prescriptions     Pending Prescriptions Disp Refills    allopurinol (ZYLOPRIM) 100 MG tablet [Pharmacy Med Name: ALLOPURINOL 100 MG TABLET] 180 tablet 1     Sig: TAKE 1 TABLET BY MOUTH TWICE A DAY        Last Filled:  10/1/2021 180 tabs 1 refill     Patient Phone Number: [phone] (home)     Last appt: 7/22/2021   Next appt: Visit date not found    Last OARRS: No flowsheet data found.

## 2022-03-31 DIAGNOSIS — E11.9 TYPE 2 DIABETES MELLITUS WITHOUT COMPLICATION, WITHOUT LONG-TERM CURRENT USE OF INSULIN (HCC): ICD-10-CM

## 2022-03-31 RX ORDER — BLOOD SUGAR DIAGNOSTIC
STRIP MISCELLANEOUS
Qty: 50 STRIP | Refills: 5 | Status: SHIPPED | OUTPATIENT
Start: 2022-03-31 | End: 2022-09-19

## 2022-03-31 NOTE — TELEPHONE ENCOUNTER
Medication:   Requested Prescriptions     Pending Prescriptions Disp Refills    ONETOUCH ULTRA strip [Pharmacy Med Name: ONE TOUCH ULTRA BLUE TEST STRP] 50 strip 5     Sig: USE TO TEST ONCE DAILY AS DIRECTED       Last Filled: 9/28/2021 #50 w/5 RF      Patient Phone Number: [phone] (home)     Last appt: 7/22/2021   Next appt: Visit date not found    Last Labs DM:   Lab Results   Component Value Date    LABA1C 6.6 10/08/2021     Last Lipid:   Lab Results   Component Value Date    CHOL 156 10/08/2021    TRIG 108 10/08/2021    HDL 38 10/08/2021    [address] 10/08/2021     Last PSA: No results found for: PSA  Last Thyroid: No results found for: TSH, FT3, C9KTDQS, T4FREE, M6BZFUJ

## 2022-04-28 ENCOUNTER — OFFICE VISIT (OUTPATIENT)
Dept: FAMILY MEDICINE CLINIC | Age: 52
End: 2022-04-28
Payer: COMMERCIAL

## 2022-04-28 VITALS
RESPIRATION RATE: 16 BRPM | WEIGHT: 209.2 LBS | DIASTOLIC BLOOD PRESSURE: 87 MMHG | SYSTOLIC BLOOD PRESSURE: 123 MMHG | BODY MASS INDEX: 28.33 KG/M2 | HEIGHT: 72 IN | HEART RATE: 83 BPM | TEMPERATURE: 97.3 F | OXYGEN SATURATION: 100 %

## 2022-04-28 DIAGNOSIS — M54.2 CERVICALGIA: ICD-10-CM

## 2022-04-28 DIAGNOSIS — G89.29 CHRONIC BILATERAL LOW BACK PAIN, UNSPECIFIED WHETHER SCIATICA PRESENT: ICD-10-CM

## 2022-04-28 DIAGNOSIS — M54.50 CHRONIC BILATERAL LOW BACK PAIN, UNSPECIFIED WHETHER SCIATICA PRESENT: ICD-10-CM

## 2022-04-28 DIAGNOSIS — E11.9 TYPE 2 DIABETES MELLITUS WITHOUT COMPLICATION, WITHOUT LONG-TERM CURRENT USE OF INSULIN (HCC): ICD-10-CM

## 2022-04-28 DIAGNOSIS — H40.89 OTHER GLAUCOMA OF BOTH EYES: ICD-10-CM

## 2022-04-28 DIAGNOSIS — R51.9 CHRONIC NONINTRACTABLE HEADACHE, UNSPECIFIED HEADACHE TYPE: Primary | ICD-10-CM

## 2022-04-28 DIAGNOSIS — Z12.11 SCREENING FOR COLON CANCER: ICD-10-CM

## 2022-04-28 DIAGNOSIS — J30.9 CHRONIC ALLERGIC RHINITIS: ICD-10-CM

## 2022-04-28 DIAGNOSIS — G89.29 CHRONIC NONINTRACTABLE HEADACHE, UNSPECIFIED HEADACHE TYPE: Primary | ICD-10-CM

## 2022-04-28 PROBLEM — H53.143 ASTHENOPIA, BILATERAL: Status: ACTIVE | Noted: 2022-04-28

## 2022-04-28 PROBLEM — H40.053 BILATERAL OCULAR HYPERTENSION: Status: ACTIVE | Noted: 2022-04-28

## 2022-04-28 PROBLEM — R26.9 ABNORMALITY OF GAIT: Status: ACTIVE | Noted: 2022-04-28

## 2022-04-28 PROCEDURE — 99214 OFFICE O/P EST MOD 30 MIN: CPT | Performed by: FAMILY MEDICINE

## 2022-04-28 PROCEDURE — 2022F DILAT RTA XM EVC RTNOPTHY: CPT | Performed by: FAMILY MEDICINE

## 2022-04-28 PROCEDURE — G8417 CALC BMI ABV UP PARAM F/U: HCPCS | Performed by: FAMILY MEDICINE

## 2022-04-28 PROCEDURE — G8427 DOCREV CUR MEDS BY ELIG CLIN: HCPCS | Performed by: FAMILY MEDICINE

## 2022-04-28 PROCEDURE — 1036F TOBACCO NON-USER: CPT | Performed by: FAMILY MEDICINE

## 2022-04-28 PROCEDURE — 3017F COLORECTAL CA SCREEN DOC REV: CPT | Performed by: FAMILY MEDICINE

## 2022-04-28 PROCEDURE — 3046F HEMOGLOBIN A1C LEVEL >9.0%: CPT | Performed by: FAMILY MEDICINE

## 2022-04-28 RX ORDER — HYDROCODONE BITARTRATE AND ACETAMINOPHEN 5; 325 MG/1; MG/1
TABLET ORAL
COMMUNITY
Start: 2022-04-12 | End: 2022-04-28 | Stop reason: ALTCHOICE

## 2022-04-28 NOTE — PROGRESS NOTES
Chief complaint: Sinus Problem (post sinus surgery 21) and Headache (since sinus surgery )      SUBJECTIVE:  ASTON Bustillo (:  1970) is a 47 y.o. male with a past medical history of glaucoma, severe AMY and chronic headaches who presents with a chief complaint of: headache and sinus problem. Chronic headache- made worse by allergies (grass, nail salon products), loud noises, bright light; he has hx of glaucoma and uses 2 different eye drops. never been diagnosed with migraines. [place] [address] and the [facility] that live around him make loud noise and are up at all hours. He can't leave unless he gets a doctor's note. He's been trying to get a new place for 6mos within public housing. Filed 3 police reports d/t noise. Hx of AMY- does allergy shots once weekly. Is allergic to nail salon products and they do that next door to his apartment and he has to leave his apartment frequently because it makes his sinuses and throat close up. He'd have to use epipen if he stayed in his house. Had ENT surgery on - at [facility]; he had blockages on the right related to chronic allergies; was given abx for one week, norco and steroid; he is done with these post op meds; pt reports ENT recommended f/u with PCP because he's been dealing with this for a while, even prior to surgery; Stopped all meds after surgery; now that it's been 2 weeks, he can restart his sinus regimen, per ENT    Hx of glaucoma- hx of service in the [facility]. [facility]. Stopped all meds after surgery including his two eye drops.      Review of Systems:  General: No F/C/NS/fatigue/wt loss   Cardiovascular: No CP  Respiratory: No SOB  GI: No N/V/D/C/abd pain/blood in stool  Neuro: No HA/weakness  Psych: No depressed mood/anxiety  Musculoskeletal: No myalgias    Past Medical History:   Diagnosis Date    Enlarged prostate      Current Outpatient Medications on File Prior to Visit   Medication Sig Dispense Refill    ibuprofen (ADVIL;MOTRIN) 600 MG tablet Take 1 tablet by mouth every 8 hours as needed for Pain 30 tablet 0    ONETOUCH ULTRA strip USE TO TEST ONCE DAILY AS DIRECTED (Patient not taking: Reported on 4/28/2022) 50 strip 5    allopurinol (ZYLOPRIM) 100 MG tablet TAKE 1 TABLET BY MOUTH TWICE A DAY (Patient not taking: Reported on 4/28/2022) 180 tablet 1    metFORMIN (GLUCOPHAGE) 500 MG tablet TAKE 1 TABLET BY MOUTH TWICE A DAY WITH MEALS (Patient not taking: Reported on 4/28/2022) 180 tablet 1    atorvastatin (LIPITOR) 40 MG tablet TAKE 1 TABLET BY MOUTH EVERY DAY (Patient not taking: Reported on 4/28/2022) 90 tablet 3    meloxicam (MOBIC) 15 MG tablet Take 1 tablet by mouth daily as needed for Pain (Patient not taking: Reported on 4/28/2022) 30 tablet 0    brimonidine (ALPHAGAN) 0.2 % ophthalmic solution INSTILL 1 DROP IN EYE(S) THREE TIMES A DAY (Patient not taking: Reported on 4/28/2022)      EPINEPHrine (EPIPEN) 0.3 MG/0.3ML SOAJ injection INJECT 1 INJECTION (0.3 ML) INTRAMUSCULAR  AS NEEDED FOR SEVERE ALLERGIC REACTION (NOTE: IF SYMPTOMS PERSIST AFTER FIRST DOSE, MAY REPEAT DOSE IN 5-15 MINUTES -- MORE THAN 2 DOSES SHOULD BE ADMINISTERED ONLY UNDER MEDICAL SUPERVISION) (Patient not taking: Reported on 4/28/2022)      Blood Glucose Monitoring Suppl (ONE TOUCH ULTRA 2) w/Device KIT USE AS DIRECTED DAILY (Patient not taking: Reported on 4/28/2022) 1 kit 0    gabapentin (NEURONTIN) 300 MG capsule Take 1 tablet nightly 30 capsule 1    Lancets MISC 1 each by Does not apply route daily Test as directed, Dispense according to insurance formulary (Patient not taking: Reported on 4/28/2022) 100 each 2    latanoprost (XALATAN) 0.005 % ophthalmic solution INSTILL ONE DROP IN BOTH EYES AT BEDTIME (Patient not taking: Reported on 4/28/2022)      blood glucose monitor strips Test 1 times a day & as needed for symptoms of irregular blood glucose.  Dispense sufficient amount for indicated testing frequency plus additional to accommodate PRN testing needs. (Patient not taking: Reported on 4/28/2022) 100 strip 1    Blood Glucose Monitoring Suppl (D-CARE GLUCOMETER) w/Device KIT 1 applicator by Does not apply route daily (Patient not taking: Reported on 4/28/2022) 30 kit 0    cetirizine (ZYRTEC) 10 MG tablet Take 10 mg by mouth daily (Patient not taking: Reported on 4/28/2022)      polyethyl glycol-propyl glycol 0.4-0.3 % (SYSTANE) 0.4-0.3 % ophthalmic solution 1 drop as needed for Dry Eyes (Patient not taking: Reported on 4/28/2022)      diclofenac sodium (VOLTAREN) 1 % GEL Apply 2 g topically 2 times daily (Patient not taking: Reported on 4/28/2022)      albuterol sulfate HFA (VENTOLIN HFA) 108 (90 Base) MCG/ACT inhaler Inhale 2 puffs into the lungs every 6 hours as needed for Wheezing (Patient not taking: Reported on 4/28/2022)      fluticasone (FLONASE) 50 MCG/ACT nasal spray 1 spray by Each Nostril route daily (Patient not taking: Reported on 4/28/2022)      Hypertonic Nasal Wash (SINUS RINSE NA) by Nasal route (Patient not taking: Reported on 4/28/2022)       No current facility-administered medications on file prior to visit. OBJECTIVE:  /87 (Site: Left Upper Arm, Position: Sitting, Cuff Size: Large Adult)   Pulse 83   Temp 97.3 °F (36.3 °C) (Temporal)   Resp 16   Ht 6' (1.829 m)   Wt 209 lb 3.2 oz (94.9 kg)   SpO2 100%   BMI 28.37 kg/m²      Physical EXAM:  afebrile, vitals reviewed  Gen:  No acute distress, A/Ox3, pleasant; mentating appropriately  Eyes:  Sclerae clear, EOM intact  Neck:  No obvious thyromegaly. Heart:  Regular rate  Lungs:  breathing comfortably on RA, no cough  Abd:  non-distended  Skin: No obvious rashes    ASSESSMENT/PLAN:  1. Chronic nonintractable headache, unspecified headache type  Chronic, uncontrolled. Related to severe allergies to environment and noxious smells that are surrounding him in his apartment unit. Paperwork filled out for him to get different living accommodations through [email or web address] ([address] and [address])    2. Screening for colon cancer  Pt prefers cologuard to c-scope. No famhx or personal hx of CRC. cologuard ordered  -     Fecal DNA Colorectal cancer screening (Cologuard)    3. Chronic allergic rhinitis  Recommend pt restart sinus protocol per ENT recommendations    4. Type 2 diabetes mellitus without complication, without long-term current use of insulin (HCC)  Est, unknown control. Due for f/u. Stopped meds s/p sinus surgery for unclear reasons. Encouraged him to restart metformin and lipitor, however, he has a lot going on right now w/ his housing situation. Encouraged Diabetes f/u with PCP in 1-2mos when things settle down. Pt agrees    5. Other glaucoma of both eyes  Est, uncontrolled. Strongly encouraged pt to restart eye drops for glaucoma so he doesn't have an acute attack    6. Cervicalgia    7. Chronic bilateral low back pain, unspecified whether sciatica present    Return in about 4 weeks (around 5/26/2022) for diabetes follow up. Electronically signed by Jing Taylor MD on 4/28/2022 at 9:22 AM.     Please note, portions of this note were completed with a voice recognition program.  Although every effort was made to ensure the accuracy of this automated transcription, some errors in transcription may have occurred.

## 2022-05-14 LAB — NONINV COLON CA DNA+OCC BLD SCRN STL QL: NEGATIVE

## 2022-06-28 ENCOUNTER — OFFICE VISIT (OUTPATIENT)
Dept: FAMILY MEDICINE CLINIC | Age: 52
End: 2022-06-28
Payer: COMMERCIAL

## 2022-06-28 VITALS
OXYGEN SATURATION: 100 % | HEIGHT: 72 IN | WEIGHT: 205.2 LBS | TEMPERATURE: 97.7 F | DIASTOLIC BLOOD PRESSURE: 86 MMHG | RESPIRATION RATE: 16 BRPM | BODY MASS INDEX: 27.79 KG/M2 | HEART RATE: 88 BPM | SYSTOLIC BLOOD PRESSURE: 139 MMHG

## 2022-06-28 DIAGNOSIS — I10 PRIMARY HYPERTENSION: ICD-10-CM

## 2022-06-28 DIAGNOSIS — J30.9 CHRONIC ALLERGIC RHINITIS: ICD-10-CM

## 2022-06-28 DIAGNOSIS — F51.01 PRIMARY INSOMNIA: ICD-10-CM

## 2022-06-28 DIAGNOSIS — M50.90 CERVICAL DISC DISEASE: ICD-10-CM

## 2022-06-28 DIAGNOSIS — E78.2 MIXED HYPERLIPIDEMIA: ICD-10-CM

## 2022-06-28 DIAGNOSIS — Z23 NEED FOR VACCINATION: ICD-10-CM

## 2022-06-28 DIAGNOSIS — E11.42 TYPE 2 DIABETES MELLITUS WITH DIABETIC POLYNEUROPATHY, WITHOUT LONG-TERM CURRENT USE OF INSULIN (HCC): Primary | ICD-10-CM

## 2022-06-28 LAB — HBA1C MFR BLD: 6.1 %

## 2022-06-28 PROCEDURE — 2022F DILAT RTA XM EVC RTNOPTHY: CPT | Performed by: FAMILY MEDICINE

## 2022-06-28 PROCEDURE — 83036 HEMOGLOBIN GLYCOSYLATED A1C: CPT | Performed by: FAMILY MEDICINE

## 2022-06-28 PROCEDURE — G8417 CALC BMI ABV UP PARAM F/U: HCPCS | Performed by: FAMILY MEDICINE

## 2022-06-28 PROCEDURE — 90750 HZV VACC RECOMBINANT IM: CPT | Performed by: FAMILY MEDICINE

## 2022-06-28 PROCEDURE — 99214 OFFICE O/P EST MOD 30 MIN: CPT | Performed by: FAMILY MEDICINE

## 2022-06-28 PROCEDURE — 3017F COLORECTAL CA SCREEN DOC REV: CPT | Performed by: FAMILY MEDICINE

## 2022-06-28 PROCEDURE — 3044F HG A1C LEVEL LT 7.0%: CPT | Performed by: FAMILY MEDICINE

## 2022-06-28 PROCEDURE — 1036F TOBACCO NON-USER: CPT | Performed by: FAMILY MEDICINE

## 2022-06-28 PROCEDURE — G8427 DOCREV CUR MEDS BY ELIG CLIN: HCPCS | Performed by: FAMILY MEDICINE

## 2022-06-28 PROCEDURE — 90471 IMMUNIZATION ADMIN: CPT | Performed by: FAMILY MEDICINE

## 2022-06-28 PROCEDURE — 90677 PCV20 VACCINE IM: CPT | Performed by: FAMILY MEDICINE

## 2022-06-28 RX ORDER — LISINOPRIL 5 MG/1
5 TABLET ORAL DAILY
Qty: 30 TABLET | Refills: 5 | Status: SHIPPED | OUTPATIENT
Start: 2022-06-28 | End: 2022-07-21

## 2022-06-28 RX ORDER — FLUTICASONE PROPIONATE 50 MCG
1 SPRAY, SUSPENSION (ML) NASAL DAILY
Qty: 16 G | Refills: 2 | Status: SHIPPED | OUTPATIENT
Start: 2022-06-28 | End: 2022-08-21

## 2022-06-28 RX ORDER — GABAPENTIN 300 MG/1
CAPSULE ORAL
Qty: 30 CAPSULE | Refills: 1 | Status: SHIPPED | OUTPATIENT
Start: 2022-06-28 | End: 2022-08-19

## 2022-06-28 RX ORDER — ALBUTEROL SULFATE 90 UG/1
2 AEROSOL, METERED RESPIRATORY (INHALATION) EVERY 6 HOURS PRN
Qty: 18 G | Refills: 3 | Status: SHIPPED | OUTPATIENT
Start: 2022-06-28 | End: 2022-10-04

## 2022-06-28 RX ORDER — TRAZODONE HYDROCHLORIDE 50 MG/1
50 TABLET ORAL NIGHTLY
Qty: 90 TABLET | Refills: 1 | Status: SHIPPED | OUTPATIENT
Start: 2022-06-28

## 2022-06-28 RX ORDER — MELOXICAM 15 MG/1
15 TABLET ORAL DAILY PRN
Qty: 30 TABLET | Refills: 0 | Status: SHIPPED | OUTPATIENT
Start: 2022-06-28 | End: 2022-07-25

## 2022-06-28 ASSESSMENT — PATIENT HEALTH QUESTIONNAIRE - PHQ9
SUM OF ALL RESPONSES TO PHQ QUESTIONS 1-9: 13
7. TROUBLE CONCENTRATING ON THINGS, SUCH AS READING THE NEWSPAPER OR WATCHING TELEVISION: 3
2. FEELING DOWN, DEPRESSED OR HOPELESS: 2
4. FEELING TIRED OR HAVING LITTLE ENERGY: 2
SUM OF ALL RESPONSES TO PHQ QUESTIONS 1-9: 13
6. FEELING BAD ABOUT YOURSELF - OR THAT YOU ARE A FAILURE OR HAVE LET YOURSELF OR YOUR FAMILY DOWN: 1
1. LITTLE INTEREST OR PLEASURE IN DOING THINGS: 2
5. POOR APPETITE OR OVEREATING: 1
SUM OF ALL RESPONSES TO PHQ QUESTIONS 1-9: 13
3. TROUBLE FALLING OR STAYING ASLEEP: 2
10. IF YOU CHECKED OFF ANY PROBLEMS, HOW DIFFICULT HAVE THESE PROBLEMS MADE IT FOR YOU TO DO YOUR WORK, TAKE CARE OF THINGS AT HOME, OR GET ALONG WITH OTHER PEOPLE: 1
SUM OF ALL RESPONSES TO PHQ9 QUESTIONS 1 & 2: 4
8. MOVING OR SPEAKING SO SLOWLY THAT OTHER PEOPLE COULD HAVE NOTICED. OR THE OPPOSITE, BEING SO FIGETY OR RESTLESS THAT YOU HAVE BEEN MOVING AROUND A LOT MORE THAN USUAL: 0
SUM OF ALL RESPONSES TO PHQ QUESTIONS 1-9: 13
9. THOUGHTS THAT YOU WOULD BE BETTER OFF DEAD, OR OF HURTING YOURSELF: 0

## 2022-06-28 NOTE — PROGRESS NOTES
Chief Complaint: Diabetes (Type 2 Diabetic Follow-Up; last A1C= 6.6% on 10/8/21)       HPI:  Willow Holland is a 47 y.o. male here for follow-up of chronic medical problems. He has history of type 2 diabetes and has been taking metformin 500 mg twice a day. His A1c is 6.6  Denies any hypoglycemic episodes    His blood pressure is 139/86. Been on blood pressure medications. He had his blood work done at [facility] and was normal    His neighbors are too loud and he has been struggling to sleep  Never had any issues with the sleep but now he has been very irritable without sleeping. He has hyperlipidemia and has been taking Lipitor    He has cervical neck pain due to disc degeneration and has tingling and numbness going to his hands. He has been taking gabapentin and request for the refill. Denies any other concern    ROS:  Constitutional: Negative for appetite change, fatigue, fever and unexpected weight change. HENT: Negative   Respiratory: Negative for cough, chest tightness, shortness of breath and wheezing. Cardiovascular: Negative for chest pain, palpitations and leg swelling. Gastrointestinal: Negative for abdominal pain, blood in stool, constipation, diarrhea, nausea and vomiting. Genitourinary: Negative for difficulty urinating, flank pain, frequency, hematuria and urgency. Musculoskeletal: As mentioned above  Skin: Negative for color change, pallor, rash and wound. Neurological: Negative . Psychiatric/Behavioral: no concerns    Patient's problem list, medications, allergies, past medical, surgical, social and family histories were reviewed and updated as appropriate.      Current Outpatient Medications   Medication Sig Dispense Refill    gabapentin (NEURONTIN) 300 MG capsule Take 1 tablet nightly 30 capsule 1    albuterol sulfate HFA (VENTOLIN HFA) 108 (90 Base) MCG/ACT inhaler Inhale 2 puffs into the lungs every 6 hours as needed for Wheezing 18 g 3    meloxicam (MOBIC) 15 MG tablet Take 1 tablet by mouth daily as needed for Pain 30 tablet 0    fluticasone (FLONASE) 50 MCG/ACT nasal spray 1 spray by Each Nostril route daily 16 g 2    lisinopril (PRINIVIL;ZESTRIL) 5 MG tablet Take 1 tablet by mouth daily 30 tablet 5    traZODone (DESYREL) 50 MG tablet Take 1 tablet by mouth nightly 90 tablet 1    ONETOUCH ULTRA strip USE TO TEST ONCE DAILY AS DIRECTED 50 strip 5    allopurinol (ZYLOPRIM) 100 MG tablet TAKE 1 TABLET BY MOUTH TWICE A  tablet 1    metFORMIN (GLUCOPHAGE) 500 MG tablet TAKE 1 TABLET BY MOUTH TWICE A DAY WITH MEALS 180 tablet 1    atorvastatin (LIPITOR) 40 MG tablet TAKE 1 TABLET BY MOUTH EVERY DAY 90 tablet 3    brimonidine (ALPHAGAN) 0.2 % ophthalmic solution INSTILL 1 DROP IN EYE(S) THREE TIMES A DAY      EPINEPHrine (EPIPEN) 0.3 MG/0.3ML SOAJ injection INJECT 1 INJECTION (0.3 ML) INTRAMUSCULAR  AS NEEDED FOR SEVERE ALLERGIC REACTION (NOTE: IF SYMPTOMS PERSIST AFTER FIRST DOSE, MAY REPEAT DOSE IN 5-15 MINUTES -- MORE THAN 2 DOSES SHOULD BE ADMINISTERED ONLY UNDER MEDICAL SUPERVISION)      Blood Glucose Monitoring Suppl (ONE TOUCH ULTRA 2) w/Device KIT USE AS DIRECTED DAILY 1 kit 0    Lancets MISC 1 each by Does not apply route daily Test as directed, Dispense according to insurance formulary 100 each 2    blood glucose monitor strips Test 1 times a day & as needed for symptoms of irregular blood glucose. Dispense sufficient amount for indicated testing frequency plus additional to accommodate PRN testing needs. 100 strip 1    Hypertonic Nasal Wash (SINUS RINSE NA) by Nasal route        No current facility-administered medications for this visit.        Social History     Tobacco Use    Smoking status: Never Smoker    Smokeless tobacco: Never Used   Substance Use Topics    Alcohol use: Not Currently        Objective:     Vitals:    06/28/22 1020 06/28/22 1045   BP: (!) 137/95 139/86   Site: Left Upper Arm Left Upper Arm   Position: Sitting Sitting   Cuff Size: Medium Adult Medium Adult   Pulse: 88    Resp: 16    Temp: 97.7 °F (36.5 °C)    TempSrc: Temporal    SpO2: 100%    Weight: 205 lb 3.2 oz (93.1 kg)    Height: 6' (1.829 m)      Body mass index is 27.83 kg/m². Wt Readings from Last 3 Encounters:   06/28/22 205 lb 3.2 oz (93.1 kg)   04/28/22 209 lb 3.2 oz (94.9 kg)   07/22/21 211 lb (95.7 kg)     BP Readings from Last 3 Encounters:   06/28/22 139/86   04/28/22 123/87   07/22/21 118/78       Physical exam:  Constitutional: he is oriented to person, place, and time. he appears well-developed and well-nourished. No distress. Neck: Normal range of motion. No JVD present. No tracheal deviation present. No thyromegaly present. Cardiovascular: Normal rate, regular rhythm, normal heart sounds and intact distal pulses. No murmur heard. Pulmonary/Chest: Effort normal and breath sounds normal. No stridor. No respiratory distress. he has no wheezes. he has no rales. heexhibits no tenderness. Abdominal: Soft. Bowel sounds are normal. he exhibits no distension and no mass. There is no tenderness. There is no rebound and no guarding. Musculoskeletal: currently normal spine exam  Lymphadenopathy:     he has no cervical adenopathy. Neurological:he is alert and oriented to person, place, and time. he has gross neurological exam normal with normal strength and normal gait  Skin: Skin is warm and dry. No rash noted. he is not diaphoretic. No erythema. No pallor. Psychiatric: he has a normal mood and affect. his   behavior is normal.      Assessment/Plan:   1. Type 2 diabetes mellitus with diabetic polyneuropathy, without long-term current use of insulin ([facility])  Hba1c 6.6 and we will continue the same metformin 500 mg bid  -  DIABETES FOOT EXAM  - POCT glycosylated hemoglobin (Hb A1C)  - CBC with Auto Differential; Future  - Basic Metabolic Panel; Future  - Lipid Panel; Future  - MICROALBUMIN / CREATININE URINE RATIO; Future    2.  Cervical disc disease  Advised to take prn  - meloxicam (MOBIC) 15 MG tablet; Take 1 tablet by mouth daily as needed for Pain  Dispense: 30 tablet; Refill: 0    3. Chronic allergic rhinitis  - albuterol sulfate HFA (VENTOLIN HFA) 108 (90 Base) MCG/ACT inhaler; Inhale 2 puffs into the lungs every 6 hours as needed for Wheezing  Dispense: 18 g; Refill: 3  - fluticasone (FLONASE) 50 MCG/ACT nasal spray; 1 spray by Each Nostril route daily  Dispense: 16 g; Refill: 2    4. Need for vaccination  - Zoster, SHINGRIX, (18 yrs +), IM  - Pneumococcal, PCV20, PREVNAR 21, (age 25 yrs+), IM, PF    5. Mixed hyperlipidemia  Advised to get blood work and continue the lipitor 40 mg daily    6.  Primary hypertension  We will start the lisinopril 5 mg daily     7 Insomnia  Started on trazodone 50 mg daily    Follow up in 6 months    Sara Collins MD  6/28/2022 11:23 AM

## 2022-07-20 DIAGNOSIS — I10 PRIMARY HYPERTENSION: ICD-10-CM

## 2022-07-20 NOTE — TELEPHONE ENCOUNTER
Medication:   Requested Prescriptions     Pending Prescriptions Disp Refills    lisinopril (PRINIVIL;ZESTRIL) 5 MG tablet [Pharmacy Med Name: LISINOPRIL 5 MG TABLET] 30 tablet 5     Sig: TAKE 1 TABLET BY MOUTH EVERY DAY        Last Filled:  6/28/2022 30 tabs 5 refills     Patient Phone Number: [phone] (home)     Last appt: 6/28/2022   Next appt: 12/29/2022    Last OARRS: No flowsheet data found.

## 2022-07-21 RX ORDER — LISINOPRIL 5 MG/1
TABLET ORAL
Qty: 30 TABLET | Refills: 5 | Status: SHIPPED | OUTPATIENT
Start: 2022-07-21

## 2022-07-25 DIAGNOSIS — M50.90 CERVICAL DISC DISEASE: ICD-10-CM

## 2022-07-25 RX ORDER — MELOXICAM 15 MG/1
TABLET ORAL
Qty: 30 TABLET | Refills: 0 | Status: SHIPPED | OUTPATIENT
Start: 2022-07-25 | End: 2022-08-24

## 2022-08-18 DIAGNOSIS — E11.40 TYPE 2 DIABETES MELLITUS WITH DIABETIC NEUROPATHY, UNSPECIFIED (HCC): ICD-10-CM

## 2022-08-18 NOTE — TELEPHONE ENCOUNTER
Medication:   Requested Prescriptions     Pending Prescriptions Disp Refills    gabapentin (NEURONTIN) 300 MG capsule [Pharmacy Med Name: GABAPENTIN 300 MG CAPSULE] 30 capsule 1     Sig: TAKE 1 CAPSULE BY MOUTH EVERY DAY AT NIGHT        Last Filled:  6/28/2022 30 caps 1 refill     Patient Phone Number: [phone] (home)     Last appt: 6/28/2022   Next appt: 12/29/2022    Last OARRS: No flowsheet data found.

## 2022-08-19 DIAGNOSIS — J30.9 CHRONIC ALLERGIC RHINITIS: ICD-10-CM

## 2022-08-19 RX ORDER — GABAPENTIN 300 MG/1
CAPSULE ORAL
Qty: 30 CAPSULE | Refills: 1 | Status: SHIPPED | OUTPATIENT
Start: 2022-08-19 | End: 2022-09-19

## 2022-08-19 NOTE — TELEPHONE ENCOUNTER
Medication:   Requested Prescriptions     Pending Prescriptions Disp Refills    fluticasone (FLONASE) 50 MCG/ACT nasal spray [Pharmacy Med Name: FLUTICASONE PROP 50 MCG SPRAY]  2     Sig: SPRAY 1 SPRAY INTO EACH NOSTRIL EVERY DAY        Last Filled:  6/28/2022 16 g 2 refills     Patient Phone Number: [phone] (home)     Last appt: 6/28/2022   Next appt: 12/29/2022    Last OARRS: No flowsheet data found.

## 2022-08-21 RX ORDER — FLUTICASONE PROPIONATE 50 MCG
SPRAY, SUSPENSION (ML) NASAL
Qty: 1 EACH | Refills: 2 | Status: SHIPPED | OUTPATIENT
Start: 2022-08-21

## 2022-08-23 DIAGNOSIS — M50.90 CERVICAL DISC DISEASE: ICD-10-CM

## 2022-08-23 NOTE — TELEPHONE ENCOUNTER
Medication:   Requested Prescriptions     Pending Prescriptions Disp Refills    meloxicam (MOBIC) 15 MG tablet [Pharmacy Med Name: MELOXICAM 15 MG TABLET] 30 tablet 0     Sig: TAKE 1 TABLET BY MOUTH EVERY DAY AS NEEDED FOR PAIN     Last Filled:  7.25.22#30 refills 0    Last appt: 6/28/2022   Next appt: 12/29/2022    Last OARRS: No flowsheet data found.

## 2022-08-24 RX ORDER — MELOXICAM 15 MG/1
TABLET ORAL
Qty: 30 TABLET | Refills: 0 | Status: SHIPPED | OUTPATIENT
Start: 2022-08-24 | End: 2022-09-21

## 2022-09-19 DIAGNOSIS — E11.9 TYPE 2 DIABETES MELLITUS WITHOUT COMPLICATION, WITHOUT LONG-TERM CURRENT USE OF INSULIN (HCC): ICD-10-CM

## 2022-09-19 RX ORDER — BLOOD SUGAR DIAGNOSTIC
STRIP MISCELLANEOUS
Qty: 50 STRIP | Refills: 5 | Status: SHIPPED | OUTPATIENT
Start: 2022-09-19

## 2022-09-19 NOTE — TELEPHONE ENCOUNTER
Medication:   Requested Prescriptions     Pending Prescriptions Disp Refills    ONETOUCH ULTRA strip [Pharmacy Med Name: ONE TOUCH ULTRA BLUE TEST STRP] 50 strip 5     Sig: USE TO TEST ONCE DAILY AS DIRECTED        Last Filled:  3/31/2022 50 strip 5 refills     Patient Phone Number: [phone] (home)     Last appt: 6/28/2022   Next appt: 12/29/2022    Last OARRS: No flowsheet data found.

## 2022-09-20 DIAGNOSIS — M50.90 CERVICAL DISC DISEASE: ICD-10-CM

## 2022-09-20 NOTE — TELEPHONE ENCOUNTER
Medication:   Requested Prescriptions     Pending Prescriptions Disp Refills    meloxicam (MOBIC) 15 MG tablet [Pharmacy Med Name: MELOXICAM 15 MG TABLET] 30 tablet 0     Sig: TAKE 1 TABLET BY MOUTH EVERY DAY AS NEEDED FOR PAIN     Last Filled:  8.24.22 #30 refills 0    Last appt: 6/28/2022   Next appt: 12/29/2022    Last OARRS: No flowsheet data found.

## 2022-09-21 RX ORDER — MELOXICAM 15 MG/1
TABLET ORAL
Qty: 30 TABLET | Refills: 0 | Status: SHIPPED | OUTPATIENT
Start: 2022-09-21 | End: 2022-10-24

## 2022-09-22 DIAGNOSIS — M1A.0790 CHRONIC GOUT OF ANKLE, UNSPECIFIED CAUSE, UNSPECIFIED LATERALITY: ICD-10-CM

## 2022-09-22 RX ORDER — ALLOPURINOL 100 MG/1
TABLET ORAL
Qty: 180 TABLET | Refills: 1 | Status: SHIPPED | OUTPATIENT
Start: 2022-09-22

## 2022-09-22 NOTE — TELEPHONE ENCOUNTER
Medication:   Requested Prescriptions     Pending Prescriptions Disp Refills    allopurinol (ZYLOPRIM) 100 MG tablet [Pharmacy Med Name: ALLOPURINOL 100 MG TABLET] 180 tablet 1     Sig: TAKE 1 TABLET BY MOUTH TWICE A DAY        Last Filled:  36/25/2022 180 tabs 1 refill     Patient Phone Number: [phone] (home)     Last appt: 6/28/2022   Next appt: 12/29/2022    Last OARRS: No flowsheet data found.

## 2022-10-01 DIAGNOSIS — J30.9 CHRONIC ALLERGIC RHINITIS: ICD-10-CM

## 2022-10-03 NOTE — TELEPHONE ENCOUNTER
Medication:   Requested Prescriptions     Pending Prescriptions Disp Refills    PROAIR  (90 Base) MCG/ACT inhaler [Pharmacy Med Name: Cesar Stagger 90 MCG INHALER] 8.5 each 3     Sig: TAKE 2 PUFFS BY MOUTH EVERY 6 HOURS AS NEEDED FOR WHEEZE        Last Filled:  6/28/2022 18 G 3 REFILLS     Patient Phone Number: [phone] (home)     Last appt: 6/28/2022   Next appt: 12/29/2022    Last OARRS: No flowsheet data found.

## 2022-10-17 DIAGNOSIS — M50.90 CERVICAL DISC DISEASE: ICD-10-CM

## 2022-10-17 NOTE — TELEPHONE ENCOUNTER
Medication:   Requested Prescriptions     Pending Prescriptions Disp Refills    diclofenac sodium (VOLTAREN) 1 % GEL [Pharmacy Med Name: DICLOFENAC SODIUM 1% GEL] 100 g 2     Sig: APPLY 1 G TOPICALLY 4 TIMES DAILY AS NEEDED FOR PAIN        Last Filled:      Patient Phone Number: [phone] (home)     Last appt: 6/28/2022   Next appt: 12/29/2022    Last OARRS: No flowsheet data found.

## 2022-10-22 DIAGNOSIS — M50.90 CERVICAL DISC DISEASE: ICD-10-CM

## 2022-10-24 RX ORDER — MELOXICAM 15 MG/1
TABLET ORAL
Qty: 30 TABLET | Refills: 0 | Status: SHIPPED | OUTPATIENT
Start: 2022-10-24 | End: 2022-11-28

## 2022-10-24 NOTE — TELEPHONE ENCOUNTER
Medication:   Requested Prescriptions     Pending Prescriptions Disp Refills    meloxicam (MOBIC) 15 MG tablet [Pharmacy Med Name: MELOXICAM 15 MG TABLET] 30 tablet 0     Sig: TAKE 1 TABLET BY MOUTH EVERY DAY AS NEEDED FOR PAIN        Last Filled:      Patient Phone Number: [phone] (home)     Last appt: 6/28/2022   Next appt: 12/29/2022    Last OARRS: No flowsheet data found.

## 2022-11-28 DIAGNOSIS — M50.90 CERVICAL DISC DISEASE: ICD-10-CM

## 2022-11-28 RX ORDER — MELOXICAM 15 MG/1
TABLET ORAL
Qty: 30 TABLET | Refills: 0 | Status: SHIPPED | OUTPATIENT
Start: 2022-11-28

## 2022-11-28 NOTE — TELEPHONE ENCOUNTER
Medication:   Requested Prescriptions     Pending Prescriptions Disp Refills    meloxicam (MOBIC) 15 MG tablet [Pharmacy Med Name: MELOXICAM 15 MG TABLET] 30 tablet 0     Sig: TAKE 1 TABLET BY MOUTH EVERY DAY AS NEEDED FOR PAIN        Last Filled:      Patient Phone Number: [phone] (home)     Last appt: 6/28/2022   Next appt: 12/29/2022    Last OARRS: No flowsheet data found.

## 2022-12-14 DIAGNOSIS — F51.01 PRIMARY INSOMNIA: ICD-10-CM

## 2022-12-14 DIAGNOSIS — I10 PRIMARY HYPERTENSION: ICD-10-CM

## 2022-12-14 DIAGNOSIS — M50.90 CERVICAL DISC DISEASE: ICD-10-CM

## 2022-12-15 RX ORDER — LISINOPRIL 5 MG/1
TABLET ORAL
Qty: 90 TABLET | Refills: 1 | Status: SHIPPED | OUTPATIENT
Start: 2022-12-15

## 2022-12-15 RX ORDER — MELOXICAM 15 MG/1
TABLET ORAL
Qty: 30 TABLET | Refills: 0 | Status: SHIPPED | OUTPATIENT
Start: 2022-12-15

## 2022-12-15 RX ORDER — TRAZODONE HYDROCHLORIDE 50 MG/1
50 TABLET ORAL NIGHTLY
Qty: 90 TABLET | Refills: 1 | Status: SHIPPED | OUTPATIENT
Start: 2022-12-15

## 2023-01-05 DIAGNOSIS — J30.9 CHRONIC ALLERGIC RHINITIS: ICD-10-CM

## 2023-01-05 RX ORDER — ALBUTEROL SULFATE 90 UG/1
AEROSOL, METERED RESPIRATORY (INHALATION)
Qty: 18 EACH | Refills: 3 | Status: SHIPPED | OUTPATIENT
Start: 2023-01-05

## 2023-01-05 NOTE — TELEPHONE ENCOUNTER
Medication:   Requested Prescriptions     Pending Prescriptions Disp Refills    albuterol sulfate HFA (PROVENTIL;VENTOLIN;PROAIR) 108 (90 Base) MCG/ACT inhaler [Pharmacy Med Name: ALBUTEROL HFA (VENTOLIN) INH] 18 each 3     Sig: INHALE 2 PUFFS BY MOUTH EVERY 6 HOURS AS NEEDED FOR WHEEZE        Last Filled:      Patient Phone Number: [phone] (home)     Last appt: 6/28/2022   Next appt: Visit date not found    Last OARRS: No flowsheet data found.

## 2023-01-11 DIAGNOSIS — M50.90 CERVICAL DISC DISEASE: ICD-10-CM

## 2023-01-11 RX ORDER — MELOXICAM 15 MG/1
TABLET ORAL
Qty: 30 TABLET | Refills: 0 | Status: SHIPPED | OUTPATIENT
Start: 2023-01-11

## 2023-01-11 NOTE — TELEPHONE ENCOUNTER
Medication:   Requested Prescriptions     Pending Prescriptions Disp Refills    meloxicam (MOBIC) 15 MG tablet [Pharmacy Med Name: MELOXICAM 15 MG TABLET] 30 tablet 0     Sig: TAKE 1 TABLET BY MOUTH EVERY DAY AS NEEDED FOR PAIN        Last Filled:      Patient Phone Number: [phone] (home)     Last appt: 6/28/2022   Next appt: Visit date not found    Last OARRS: No flowsheet data found.

## 2023-03-08 DIAGNOSIS — M50.90 CERVICAL DISC DISEASE: ICD-10-CM

## 2023-03-09 NOTE — TELEPHONE ENCOUNTER
Medication:   Requested Prescriptions     Pending Prescriptions Disp Refills    diclofenac sodium (VOLTAREN) 1 % GEL [Pharmacy Med Name: DICLOFENAC SODIUM 1% GEL] 100 g 2     Sig: APPLY 1 G TOPICALLY 4 TIMES DAILY AS NEEDED FOR PAIN        Last Filled:      Patient Phone Number: [phone] (home)     Last appt: 6/28/2022   Next appt: Visit date not found    Last OARRS: No flowsheet data found.

## 2023-03-21 DIAGNOSIS — E11.9 TYPE 2 DIABETES MELLITUS WITHOUT COMPLICATION, WITHOUT LONG-TERM CURRENT USE OF INSULIN (HCC): ICD-10-CM

## 2023-03-21 RX ORDER — BLOOD SUGAR DIAGNOSTIC
STRIP MISCELLANEOUS
Qty: 50 STRIP | Refills: 0 | Status: SHIPPED | OUTPATIENT
Start: 2023-03-21

## 2023-03-21 NOTE — TELEPHONE ENCOUNTER
Medication:   Requested Prescriptions     Pending Prescriptions Disp Refills    ONETOUCH ULTRA strip [Pharmacy Med Name: ONE TOUCH ULTRA BLUE TEST STRP] 50 strip 5     Sig: USE DAILY AS DIRECTED       Last Filled:  09/19/2022 #50 5rf    Patient Phone Number: [phone] (home)     Last appt: 6/28/2022   Next appt: Visit date not found    Last Labs DM:   Lab Results   Component Value Date/Time    LABA1C 6.1 06/28/2022 11:03 AM

## 2023-03-23 DIAGNOSIS — M1A.0790 CHRONIC GOUT OF ANKLE, UNSPECIFIED CAUSE, UNSPECIFIED LATERALITY: ICD-10-CM

## 2023-03-23 RX ORDER — ALLOPURINOL 100 MG/1
TABLET ORAL
Qty: 180 TABLET | Refills: 1 | Status: SHIPPED | OUTPATIENT
Start: 2023-03-23

## 2023-03-23 NOTE — TELEPHONE ENCOUNTER
Medication:   Requested Prescriptions     Pending Prescriptions Disp Refills    allopurinol (ZYLOPRIM) 100 MG tablet [Pharmacy Med Name: ALLOPURINOL 100 MG TABLET] 180 tablet 1     Sig: TAKE 1 TABLET BY MOUTH TWICE A DAY        Last Filled:  09/22/2022 #180 1rf    Patient Phone Number: [phone] (home)     Last appt: 6/28/2022   Next appt: 4/4/2023    Last OARRS: No flowsheet data found.

## 2023-03-31 DIAGNOSIS — M50.90 CERVICAL DISC DISEASE: ICD-10-CM

## 2023-03-31 NOTE — TELEPHONE ENCOUNTER
Medication:   Requested Prescriptions     Pending Prescriptions Disp Refills    diclofenac sodium (VOLTAREN) 1 % GEL [Pharmacy Med Name: DICLOFENAC SODIUM 1% GEL] 100 g 0     Sig: APPLY 1 G TOPICALLY 4 TIMES DAILY AS NEEDED FOR PAIN        Last Filled:  03/09/2023 #1 0rf     Patient Phone Number: [phone] (home)     Last appt: 6/28/2022   Next appt: 4/4/2023    Last OARRS: No flowsheet data found.

## 2023-04-04 ENCOUNTER — OFFICE VISIT (OUTPATIENT)
Dept: FAMILY MEDICINE CLINIC | Age: 53
End: 2023-04-04
Payer: COMMERCIAL

## 2023-04-04 VITALS
TEMPERATURE: 97.3 F | DIASTOLIC BLOOD PRESSURE: 89 MMHG | BODY MASS INDEX: 29.45 KG/M2 | HEIGHT: 72 IN | OXYGEN SATURATION: 98 % | WEIGHT: 217.4 LBS | HEART RATE: 78 BPM | SYSTOLIC BLOOD PRESSURE: 130 MMHG

## 2023-04-04 DIAGNOSIS — F51.01 PRIMARY INSOMNIA: ICD-10-CM

## 2023-04-04 DIAGNOSIS — Z00.00 ANNUAL PHYSICAL EXAM: Primary | ICD-10-CM

## 2023-04-04 DIAGNOSIS — G89.29 CHRONIC PAIN OF RIGHT ANKLE: ICD-10-CM

## 2023-04-04 DIAGNOSIS — M25.571 CHRONIC PAIN OF RIGHT ANKLE: ICD-10-CM

## 2023-04-04 DIAGNOSIS — E11.9 TYPE 2 DIABETES MELLITUS WITHOUT COMPLICATION, WITHOUT LONG-TERM CURRENT USE OF INSULIN (HCC): ICD-10-CM

## 2023-04-04 DIAGNOSIS — E78.2 MIXED HYPERLIPIDEMIA: ICD-10-CM

## 2023-04-04 DIAGNOSIS — I10 PRIMARY HYPERTENSION: ICD-10-CM

## 2023-04-04 LAB — HBA1C MFR BLD: 6.6 %

## 2023-04-04 PROCEDURE — 83036 HEMOGLOBIN GLYCOSYLATED A1C: CPT | Performed by: FAMILY MEDICINE

## 2023-04-04 PROCEDURE — 3075F SYST BP GE 130 - 139MM HG: CPT | Performed by: FAMILY MEDICINE

## 2023-04-04 PROCEDURE — 3079F DIAST BP 80-89 MM HG: CPT | Performed by: FAMILY MEDICINE

## 2023-04-04 PROCEDURE — 99396 PREV VISIT EST AGE 40-64: CPT | Performed by: FAMILY MEDICINE

## 2023-04-04 RX ORDER — OLOPATADINE HYDROCHLORIDE 1 MG/ML
SOLUTION/ DROPS OPHTHALMIC
COMMUNITY
Start: 2022-12-21

## 2023-04-04 RX ORDER — FLUTICASONE PROPIONATE 110 UG/1
AEROSOL, METERED RESPIRATORY (INHALATION)
COMMUNITY
Start: 2023-02-08 | End: 2023-04-04

## 2023-04-04 RX ORDER — HYDROXYZINE PAMOATE 25 MG/1
CAPSULE ORAL
COMMUNITY
Start: 2023-03-09

## 2023-04-04 RX ORDER — MELOXICAM 15 MG/1
15 TABLET ORAL DAILY PRN
Qty: 30 TABLET | Refills: 0 | Status: SHIPPED | OUTPATIENT
Start: 2023-04-04

## 2023-04-04 RX ORDER — IBUPROFEN 800 MG/1
800 TABLET ORAL
Qty: 90 TABLET | Refills: 0 | Status: SHIPPED | OUTPATIENT
Start: 2023-04-04

## 2023-04-04 RX ORDER — DILTIAZEM HYDROCHLORIDE 60 MG/1
TABLET, FILM COATED ORAL
COMMUNITY
Start: 2023-03-14

## 2023-04-04 SDOH — ECONOMIC STABILITY: FOOD INSECURITY: WITHIN THE PAST 12 MONTHS, YOU WORRIED THAT YOUR FOOD WOULD RUN OUT BEFORE YOU GOT MONEY TO BUY MORE.: NEVER TRUE

## 2023-04-04 SDOH — ECONOMIC STABILITY: INCOME INSECURITY: HOW HARD IS IT FOR YOU TO PAY FOR THE VERY BASICS LIKE FOOD, HOUSING, MEDICAL CARE, AND HEATING?: NOT HARD AT ALL

## 2023-04-04 SDOH — ECONOMIC STABILITY: HOUSING INSECURITY
IN THE LAST 12 MONTHS, WAS THERE A TIME WHEN YOU DID NOT HAVE A STEADY PLACE TO SLEEP OR SLEPT IN A SHELTER (INCLUDING NOW)?: NO

## 2023-04-04 SDOH — ECONOMIC STABILITY: FOOD INSECURITY: WITHIN THE PAST 12 MONTHS, THE FOOD YOU BOUGHT JUST DIDN'T LAST AND YOU DIDN'T HAVE MONEY TO GET MORE.: NEVER TRUE

## 2023-04-04 ASSESSMENT — PATIENT HEALTH QUESTIONNAIRE - PHQ9
SUM OF ALL RESPONSES TO PHQ QUESTIONS 1-9: 0
SUM OF ALL RESPONSES TO PHQ QUESTIONS 1-9: 0
SUM OF ALL RESPONSES TO PHQ9 QUESTIONS 1 & 2: 0
SUM OF ALL RESPONSES TO PHQ QUESTIONS 1-9: 0
2. FEELING DOWN, DEPRESSED OR HOPELESS: 0
SUM OF ALL RESPONSES TO PHQ QUESTIONS 1-9: 0
1. LITTLE INTEREST OR PLEASURE IN DOING THINGS: 0

## 2023-04-04 NOTE — PROGRESS NOTES
pamoate (VISTARIL) 25 MG capsule TAKE 1 CAPSULE BY MOUTH THREE TIMES A DAY AS NEEDED FOR ANXIETY AND INSOMNIA      SYMBICORT 80-4.5 MCG/ACT AERO INHALE 2 PUFFS BY MOUTH TWICE DAILY.  RINSE MOUTH AFTER EACH USE. (REPLACES FLOVENT)      olopatadine (PATANOL) 0.1 % ophthalmic solution INSTILL 1 DROP IN BOTH EYES TWICE A DAY      sertraline (ZOLOFT) 50 MG tablet Take 1 tablet by mouth daily      meloxicam (MOBIC) 15 MG tablet Take 1 tablet by mouth daily as needed for Pain 30 tablet 0    ibuprofen (ADVIL;MOTRIN) 800 MG tablet Take 1 tablet by mouth 3 times daily (with meals) 90 tablet 0    diclofenac sodium (VOLTAREN) 1 % GEL APPLY 1 G TOPICALLY 4 TIMES DAILY AS NEEDED FOR PAIN 100 g 0    allopurinol (ZYLOPRIM) 100 MG tablet TAKE 1 TABLET BY MOUTH TWICE A  tablet 1    ONETOUCH ULTRA strip USE DAILY AS DIRECTED 50 strip 0    albuterol sulfate HFA (PROVENTIL;VENTOLIN;PROAIR) 108 (90 Base) MCG/ACT inhaler INHALE 2 PUFFS BY MOUTH EVERY 6 HOURS AS NEEDED FOR WHEEZE 18 each 3    traZODone (DESYREL) 50 MG tablet TAKE 1 TABLET BY MOUTH NIGHTLY 90 tablet 1    lisinopril (PRINIVIL;ZESTRIL) 5 MG tablet TAKE 1 TABLET BY MOUTH EVERY DAY 90 tablet 1    fluticasone (FLONASE) 50 MCG/ACT nasal spray SPRAY 1 SPRAY INTO EACH NOSTRIL EVERY DAY 1 each 2    gabapentin (NEURONTIN) 300 MG capsule TAKE 1 CAPSULE BY MOUTH EVERY DAY AT NIGHT 30 capsule 1    metFORMIN (GLUCOPHAGE) 500 MG tablet TAKE 1 TABLET BY MOUTH TWICE A DAY WITH MEALS 180 tablet 1    atorvastatin (LIPITOR) 40 MG tablet TAKE 1 TABLET BY MOUTH EVERY DAY 90 tablet 3    brimonidine (ALPHAGAN) 0.2 % ophthalmic solution INSTILL 1 DROP IN EYE(S) THREE TIMES A DAY      EPINEPHrine (EPIPEN) 0.3 MG/0.3ML SOAJ injection INJECT 1 INJECTION (0.3 ML) INTRAMUSCULAR  AS NEEDED FOR SEVERE ALLERGIC REACTION (NOTE: IF SYMPTOMS PERSIST AFTER FIRST DOSE, MAY REPEAT DOSE IN 5-15 MINUTES -- MORE THAN 2 DOSES SHOULD BE ADMINISTERED ONLY UNDER MEDICAL SUPERVISION)      Blood Glucose Monitoring

## 2023-04-06 DIAGNOSIS — J30.9 CHRONIC ALLERGIC RHINITIS: ICD-10-CM

## 2023-04-06 RX ORDER — ALBUTEROL SULFATE 90 UG/1
AEROSOL, METERED RESPIRATORY (INHALATION)
Qty: 18 EACH | Refills: 3 | Status: SHIPPED | OUTPATIENT
Start: 2023-04-06

## 2023-04-06 NOTE — TELEPHONE ENCOUNTER
Medication:   Requested Prescriptions     Pending Prescriptions Disp Refills    VENTOLIN  (90 Base) MCG/ACT inhaler [Pharmacy Med Name: VENTOLIN HFA 90 MCG INHALER] 18 each 3     Sig: INHALE 2 PUFFS BY MOUTH EVERY 6 HOURS AS NEEDED FOR WHEEZING        Last Filled:  01/05/2023 #1 3rf    Patient Phone Number: [phone] (home)     Last appt: 4/4/2023   Next appt: 5/4/2023    Last OARRS: No flowsheet data found.

## 2023-04-21 DIAGNOSIS — M50.90 CERVICAL DISC DISEASE: ICD-10-CM

## 2023-04-21 NOTE — TELEPHONE ENCOUNTER
Medication:   Requested Prescriptions     Pending Prescriptions Disp Refills    diclofenac sodium (VOLTAREN) 1 % GEL [Pharmacy Med Name: DICLOFENAC SODIUM 1% GEL] 100 g 0     Sig: APPLY 1 G TOPICALLY 4 TIMES DAILY AS NEEDED FOR PAIN        Last Filled:  03/31/2023 #100g 0rf     Patient Phone Number: [phone] (home)     Last appt: 4/4/2023   Next appt: 5/4/2023    Last OARRS: No flowsheet data found.

## 2023-05-09 DIAGNOSIS — E11.9 TYPE 2 DIABETES MELLITUS WITHOUT COMPLICATION, WITHOUT LONG-TERM CURRENT USE OF INSULIN (HCC): ICD-10-CM

## 2023-05-12 RX ORDER — BLOOD SUGAR DIAGNOSTIC
STRIP MISCELLANEOUS
Qty: 50 STRIP | Refills: 0 | Status: SHIPPED | OUTPATIENT
Start: 2023-05-12

## 2023-05-12 RX ORDER — IBUPROFEN 800 MG/1
TABLET ORAL
Qty: 90 TABLET | Refills: 0 | Status: SHIPPED | OUTPATIENT
Start: 2023-05-12

## 2023-05-12 NOTE — TELEPHONE ENCOUNTER
Medication:   Requested Prescriptions     Pending Prescriptions Disp Refills    ONETOUCH ULTRA strip [Pharmacy Med Name: ONE TOUCH ULTRA BLUE TEST STRP] 50 strip 0     Sig: USE DAILY AS DIRECTED        Last Filled:      Patient Phone Number: [phone] (home)     Last appt: 4/4/2023   Next appt: 5/15/2023    Last OARRS: No flowsheet data found.

## 2023-05-12 NOTE — TELEPHONE ENCOUNTER
Medication:   Requested Prescriptions     Pending Prescriptions Disp Refills    ibuprofen (ADVIL;MOTRIN) 800 MG tablet [Pharmacy Med Name: IBUPROFEN 800 MG TABLET] 90 tablet 0     Sig: TAKE 1 TABLET BY MOUTH 3 TIMES DAILY WITH MEALS        Last Filled:  4/4/2023 90 tabs 0 refllls     Patient Phone Number: [phone] (home)     Last appt: 4/4/2023   Next appt: 5/9/2023    Last OARRS: No flowsheet data found.

## 2023-05-15 ENCOUNTER — OFFICE VISIT (OUTPATIENT)
Dept: FAMILY MEDICINE CLINIC | Age: 53
End: 2023-05-15
Payer: COMMERCIAL

## 2023-05-15 VITALS
HEIGHT: 72 IN | OXYGEN SATURATION: 97 % | WEIGHT: 215 LBS | SYSTOLIC BLOOD PRESSURE: 142 MMHG | HEART RATE: 97 BPM | BODY MASS INDEX: 29.12 KG/M2 | DIASTOLIC BLOOD PRESSURE: 88 MMHG

## 2023-05-15 DIAGNOSIS — E11.42 TYPE 2 DIABETES MELLITUS WITH DIABETIC POLYNEUROPATHY, WITHOUT LONG-TERM CURRENT USE OF INSULIN (HCC): ICD-10-CM

## 2023-05-15 DIAGNOSIS — I10 PRIMARY HYPERTENSION: ICD-10-CM

## 2023-05-15 DIAGNOSIS — E11.9 TYPE 2 DIABETES MELLITUS WITHOUT COMPLICATION, WITHOUT LONG-TERM CURRENT USE OF INSULIN (HCC): Primary | ICD-10-CM

## 2023-05-15 DIAGNOSIS — M1A.0710 CHRONIC GOUT OF RIGHT ANKLE, UNSPECIFIED CAUSE: ICD-10-CM

## 2023-05-15 PROCEDURE — G8427 DOCREV CUR MEDS BY ELIG CLIN: HCPCS | Performed by: FAMILY MEDICINE

## 2023-05-15 PROCEDURE — 2022F DILAT RTA XM EVC RTNOPTHY: CPT | Performed by: FAMILY MEDICINE

## 2023-05-15 PROCEDURE — 3044F HG A1C LEVEL LT 7.0%: CPT | Performed by: FAMILY MEDICINE

## 2023-05-15 PROCEDURE — 1036F TOBACCO NON-USER: CPT | Performed by: FAMILY MEDICINE

## 2023-05-15 PROCEDURE — G8417 CALC BMI ABV UP PARAM F/U: HCPCS | Performed by: FAMILY MEDICINE

## 2023-05-15 PROCEDURE — 3074F SYST BP LT 130 MM HG: CPT | Performed by: FAMILY MEDICINE

## 2023-05-15 PROCEDURE — 3017F COLORECTAL CA SCREEN DOC REV: CPT | Performed by: FAMILY MEDICINE

## 2023-05-15 PROCEDURE — 3078F DIAST BP <80 MM HG: CPT | Performed by: FAMILY MEDICINE

## 2023-05-15 PROCEDURE — 99214 OFFICE O/P EST MOD 30 MIN: CPT | Performed by: FAMILY MEDICINE

## 2023-05-15 NOTE — PROGRESS NOTES
Chief Complaint: Follow-up       HPI:   Janice Li is a 47 y.o. male with history of type 2 diabetes, hypertension, insomnia, depression, gout coming for the follow-up on his ankle swelling. Currently his ankle swelling is better. He was taking allopurinol 100 mg daily. With the steroids and NSAIDs his symptoms are better. He still has not got his blood work done. He has history of type 2 diabetes and has been taking metformin 500 mg daily. His A1c has worsened to 6.6. Denies any hypoglycemic episodes. He has history of diabetic polyneuropathy. He has been currently taking gabapentin 300 mg once at nighttime. It helps him. His blood pressure is well controlled with lisinopril 5 mg daily. But today it is elevated. He has been taking trazodone 50 mg daily to help with sleep. And it is helping. Denies any concern. He has been taking Zoloft 50 mg enedelia for depression denies any concern. He has seen allergist and was prescribed Symbicort and he has been doing better. He has also been taking Lipitor 40 mg daily for-his hyperlipidemia. Denies any side effects from the medication. Patient's problem list, medications, allergies, past medical, surgical, social and family histories were reviewed and updated as appropriate.      Current Outpatient Medications   Medication Sig Dispense Refill    ibuprofen (ADVIL;MOTRIN) 800 MG tablet TAKE 1 TABLET BY MOUTH 3 TIMES DAILY WITH MEALS 90 tablet 0    ONETOUCH ULTRA strip USE DAILY AS DIRECTED 50 strip 0    gabapentin (NEURONTIN) 300 MG capsule TAKE 1 CAPSULE BY MOUTH EVERY DAY AT NIGHT 30 capsule 1    diclofenac sodium (VOLTAREN) 1 % GEL APPLY 1 G TOPICALLY 4 TIMES DAILY AS NEEDED FOR PAIN 100 g 0    VENTOLIN  (90 Base) MCG/ACT inhaler INHALE 2 PUFFS BY MOUTH EVERY 6 HOURS AS NEEDED FOR WHEEZING 18 each 3    hydrOXYzine pamoate (VISTARIL) 25 MG capsule TAKE 1 CAPSULE BY MOUTH THREE TIMES A DAY AS NEEDED FOR ANXIETY AND INSOMNIA

## 2023-05-17 DIAGNOSIS — M50.90 CERVICAL DISC DISEASE: ICD-10-CM

## 2023-05-17 NOTE — TELEPHONE ENCOUNTER
Medication:   Requested Prescriptions     Pending Prescriptions Disp Refills    diclofenac sodium (VOLTAREN) 1 % GEL [Pharmacy Med Name: DICLOFENAC SODIUM 1% GEL] 100 g 0     Sig: APPLY 1 G TOPICALLY 4 TIMES DAILY AS NEEDED FOR PAIN        Last Filled:   4/21/23 #100g w/o RF    Patient Phone Number: [phone] (home)     Last appt: 5/15/2023   Next appt: Visit date not found    Last OARRS: No flowsheet data found.

## 2023-06-09 DIAGNOSIS — F51.01 PRIMARY INSOMNIA: ICD-10-CM

## 2023-06-09 DIAGNOSIS — I10 PRIMARY HYPERTENSION: ICD-10-CM

## 2023-06-09 RX ORDER — LISINOPRIL 5 MG/1
TABLET ORAL
Qty: 90 TABLET | Refills: 1 | Status: SHIPPED | OUTPATIENT
Start: 2023-06-09

## 2023-06-09 RX ORDER — IBUPROFEN 800 MG/1
TABLET ORAL
Qty: 90 TABLET | Refills: 0 | Status: SHIPPED | OUTPATIENT
Start: 2023-06-09

## 2023-06-09 RX ORDER — TRAZODONE HYDROCHLORIDE 50 MG/1
50 TABLET ORAL NIGHTLY
Qty: 90 TABLET | Refills: 1 | Status: SHIPPED | OUTPATIENT
Start: 2023-06-09

## 2023-06-09 NOTE — TELEPHONE ENCOUNTER
Medication:   Requested Prescriptions     Pending Prescriptions Disp Refills    ibuprofen (ADVIL;MOTRIN) 800 MG tablet [Pharmacy Med Name: IBUPROFEN 800 MG TABLET] 90 tablet 0     Sig: TAKE 1 TABLET BY MOUTH 3 TIMES DAILY WITH MEALS. lisinopril (PRINIVIL;ZESTRIL) 5 MG tablet [Pharmacy Med Name: LISINOPRIL 5 MG TABLET] 90 tablet 1     Sig: TAKE 1 TABLET BY MOUTH EVERY DAY    traZODone (DESYREL) 50 MG tablet [Pharmacy Med Name: TRAZODONE 50 MG TABLET] 90 tablet 1     Sig: TAKE 1 TABLET BY MOUTH NIGHTLY     Last Filled:  5.12.23 #90 refills 0                        12.15.22 #90 refills 1                         12.15.22 #90 refills 1    Last appt: 5/15/2023   Next appt: Visit date not found    Last OARRS: No flowsheet data found.

## 2023-07-06 RX ORDER — IBUPROFEN 800 MG/1
TABLET ORAL
Qty: 90 TABLET | Refills: 0 | Status: SHIPPED | OUTPATIENT
Start: 2023-07-06

## 2023-07-06 NOTE — TELEPHONE ENCOUNTER
Medication:   Requested Prescriptions     Pending Prescriptions Disp Refills    ibuprofen (ADVIL;MOTRIN) 800 MG tablet [Pharmacy Med Name: IBUPROFEN 800 MG TABLET] 90 tablet 0     Sig: TAKE 1 TABLET BY MOUTH 3 TIMES DAILY WITH MEALS. Last Filled:      Patient Phone Number: [phone] (home)     Last appt: 5/15/2023   Next appt: Visit date not found    Last OARRS: No flowsheet data found.

## 2023-08-03 RX ORDER — IBUPROFEN 800 MG/1
TABLET ORAL
Qty: 90 TABLET | Refills: 0 | Status: SHIPPED | OUTPATIENT
Start: 2023-08-03

## 2023-08-03 NOTE — TELEPHONE ENCOUNTER
Medication:   Requested Prescriptions     Pending Prescriptions Disp Refills    ibuprofen (ADVIL;MOTRIN) 800 MG tablet [Pharmacy Med Name: IBUPROFEN 800 MG TABLET] 90 tablet 0     Sig: TAKE 1 TABLET BY MOUTH 3 TIMES DAILY WITH MEALS. Last Filled:  07/06/2023 #90 0rf    Patient Phone Number: [phone] (home)     Last appt: 5/15/2023   Next appt: Visit date not found    Last OARRS: No flowsheet data found.

## 2023-08-08 DIAGNOSIS — M50.90 CERVICAL DISC DISORDER, UNSPECIFIED, UNSPECIFIED CERVICAL REGION: ICD-10-CM

## 2023-08-08 RX ORDER — MELOXICAM 15 MG/1
TABLET ORAL
Qty: 30 TABLET | Refills: 0 | Status: SHIPPED | OUTPATIENT
Start: 2023-08-08

## 2023-08-08 NOTE — TELEPHONE ENCOUNTER
Medication:   Requested Prescriptions     Pending Prescriptions Disp Refills    meloxicam (MOBIC) 15 MG tablet [Pharmacy Med Name: MELOXICAM 15 MG TABLET] 30 tablet 0     Sig: TAKE 1 TABLET BY MOUTH EVERY DAY AS NEEDED FOR PAIN        Last Filled:  04/04/2023 #30 0rf    Patient Phone Number: [phone] (home)     Last appt: 5/15/2023   Next appt: Visit date not found    Last OARRS: No flowsheet data found.

## 2023-08-30 RX ORDER — IBUPROFEN 800 MG/1
TABLET ORAL
Qty: 90 TABLET | Refills: 0 | Status: SHIPPED | OUTPATIENT
Start: 2023-08-30

## 2023-08-30 NOTE — TELEPHONE ENCOUNTER
Medication:   Requested Prescriptions     Pending Prescriptions Disp Refills    ibuprofen (ADVIL;MOTRIN) 800 MG tablet [Pharmacy Med Name: IBUPROFEN 800 MG TABLET] 90 tablet 0     Sig: TAKE 1 TABLET BY MOUTH 3 TIMES DAILY WITH MEALS. Last Filled:  8/3/2023 #90 w/o RF     Patient Phone Number: [phone] (home)     Last appt: 5/15/2023   Next appt: Visit date not found    Last OARRS: No flowsheet data found.

## 2023-09-06 DIAGNOSIS — M50.90 CERVICAL DISC DISORDER, UNSPECIFIED, UNSPECIFIED CERVICAL REGION: ICD-10-CM

## 2023-09-06 RX ORDER — MELOXICAM 15 MG/1
TABLET ORAL
Qty: 30 TABLET | Refills: 0 | Status: SHIPPED | OUTPATIENT
Start: 2023-09-06

## 2023-09-06 NOTE — TELEPHONE ENCOUNTER
Medication:   Requested Prescriptions     Pending Prescriptions Disp Refills    meloxicam (MOBIC) 15 MG tablet [Pharmacy Med Name: MELOXICAM 15 MG TABLET] 30 tablet 0     Sig: TAKE 1 TABLET BY MOUTH EVERY DAY AS NEEDED FOR PAIN        Last Filled:  08/08/2023 #30 0rf    Patient Phone Number: [phone] (home)     Last appt: 5/15/2023   Next appt: Visit date not found    Last OARRS: No flowsheet data found.

## 2023-09-26 RX ORDER — IBUPROFEN 800 MG/1
TABLET ORAL
Qty: 90 TABLET | Refills: 0 | Status: SHIPPED | OUTPATIENT
Start: 2023-09-26

## 2023-09-26 NOTE — TELEPHONE ENCOUNTER
Medication:   Requested Prescriptions     Pending Prescriptions Disp Refills    ibuprofen (ADVIL;MOTRIN) 800 MG tablet [Pharmacy Med Name: IBUPROFEN 800 MG TABLET] 90 tablet 0     Sig: TAKE 1 TABLET BY MOUTH 3 TIMES DAILY WITH MEALS.         Last Filled:  08/30/2023 #90 0rf     Patient Phone Number: [phone] (home)     Last appt: 5/15/2023   Next appt: Visit date not found    Last OARRS:        No data to display

## 2023-10-06 DIAGNOSIS — M50.90 CERVICAL DISC DISORDER, UNSPECIFIED, UNSPECIFIED CERVICAL REGION: ICD-10-CM

## 2023-10-06 RX ORDER — MELOXICAM 15 MG/1
TABLET ORAL
Qty: 30 TABLET | Refills: 0 | Status: SHIPPED | OUTPATIENT
Start: 2023-10-06

## 2023-10-06 NOTE — TELEPHONE ENCOUNTER
Medication:   Requested Prescriptions     Pending Prescriptions Disp Refills    meloxicam (MOBIC) 15 MG tablet [Pharmacy Med Name: MELOXICAM 15 MG TABLET] 30 tablet 0     Sig: TAKE 1 TABLET BY MOUTH EVERY DAY AS NEEDED FOR PAIN        Last Filled:  09/06/2023 #30 0rf     Patient Phone Number: [phone] (home)     Last appt: 5/15/2023   Next appt: Visit date not found    Last OARRS:        No data to display

## 2023-10-24 NOTE — TELEPHONE ENCOUNTER
Medication:   Requested Prescriptions     Pending Prescriptions Disp Refills    ibuprofen (ADVIL;MOTRIN) 800 MG tablet [Pharmacy Med Name: IBUPROFEN 800 MG TABLET] 90 tablet 0     Sig: TAKE 1 TABLET BY MOUTH 3 TIMES DAILY WITH MEALS.         Last Filled:  09/26/2023 #90 0rf    Patient Phone Number: [phone] (home)     Last appt: 5/15/2023   Next appt: Visit date not found    Last OARRS:        No data to display

## 2023-10-25 RX ORDER — IBUPROFEN 800 MG/1
TABLET ORAL
Qty: 90 TABLET | Refills: 0 | Status: SHIPPED | OUTPATIENT
Start: 2023-10-25

## 2023-10-29 DIAGNOSIS — M50.90 CERVICAL DISC DISEASE: ICD-10-CM

## 2023-11-01 NOTE — TELEPHONE ENCOUNTER
Medication:   Requested Prescriptions     Pending Prescriptions Disp Refills    diclofenac sodium (VOLTAREN) 1 % GEL [Pharmacy Med Name: DICLOFENAC SODIUM 1% GEL] 100 g 5     Sig: APPLY 1 G TOPICALLY 4 TIMES DAILY AS NEEDED FOR PAIN        Last Filled:  05/17/2023 #1 5rf    Patient Phone Number: [phone] (home)     Last appt: 5/15/2023   Next appt: Visit date not found    Last OARRS:        No data to display

## 2023-11-05 DIAGNOSIS — M50.90 CERVICAL DISC DISORDER, UNSPECIFIED, UNSPECIFIED CERVICAL REGION: ICD-10-CM

## 2023-11-06 RX ORDER — MELOXICAM 15 MG/1
TABLET ORAL
Qty: 30 TABLET | Refills: 2 | Status: SHIPPED | OUTPATIENT
Start: 2023-11-06

## 2023-11-06 NOTE — TELEPHONE ENCOUNTER
Medication:   Requested Prescriptions     Pending Prescriptions Disp Refills    meloxicam (MOBIC) 15 MG tablet [Pharmacy Med Name: MELOXICAM 15 MG TABLET] 30 tablet 0     Sig: TAKE 1 TABLET BY MOUTH EVERY DAY AS NEEDED FOR PAIN        Last Filled:      Patient Phone Number: [phone] (home)     Last appt: 5/15/2023   Next appt: Visit date not found    Last OARRS:        No data to display

## 2023-11-24 RX ORDER — IBUPROFEN 800 MG/1
TABLET ORAL
Qty: 90 TABLET | Refills: 0 | Status: SHIPPED | OUTPATIENT
Start: 2023-11-24

## 2023-11-24 NOTE — TELEPHONE ENCOUNTER
Medication:   Requested Prescriptions     Pending Prescriptions Disp Refills    ibuprofen (ADVIL;MOTRIN) 800 MG tablet [Pharmacy Med Name: IBUPROFEN 800 MG TABLET] 90 tablet 0     Sig: TAKE 1 TABLET BY MOUTH 3 TIMES DAILY WITH MEALS.         Last Filled:  10/25/2023 #90 0rf     Patient Phone Number: [phone] (home)     Last appt: 5/15/2023   Next appt: Visit date not found    Last OARRS:        No data to display

## 2023-12-01 DIAGNOSIS — I10 PRIMARY HYPERTENSION: ICD-10-CM

## 2023-12-01 DIAGNOSIS — F51.01 PRIMARY INSOMNIA: ICD-10-CM

## 2023-12-01 RX ORDER — LISINOPRIL 5 MG/1
TABLET ORAL
Qty: 90 TABLET | Refills: 1 | Status: SHIPPED | OUTPATIENT
Start: 2023-12-01

## 2023-12-01 RX ORDER — TRAZODONE HYDROCHLORIDE 50 MG/1
50 TABLET ORAL NIGHTLY
Qty: 90 TABLET | Refills: 1 | Status: SHIPPED | OUTPATIENT
Start: 2023-12-01

## 2023-12-01 NOTE — TELEPHONE ENCOUNTER
Medication:   Requested Prescriptions     Pending Prescriptions Disp Refills    traZODone (DESYREL) 50 MG tablet [Pharmacy Med Name: TRAZODONE 50 MG TABLET] 90 tablet 1     Sig: TAKE 1 TABLET BY MOUTH EVERY DAY AT NIGHT    lisinopril (PRINIVIL;ZESTRIL) 5 MG tablet [Pharmacy Med Name: LISINOPRIL 5 MG TABLET] 90 tablet 1     Sig: TAKE 1 TABLET BY MOUTH EVERY DAY       Last Filled:   06/09/2023 #90 1rf    Patient Phone Number: [phone] (home)     Last appt: 5/15/2023   Next appt: Visit date not found    Lab Results   Component Value Date     10/08/2021    K 4.8 10/08/2021    CL 99 10/08/2021    CO2 24 10/08/2021    BUN 20 10/08/2021    CREATININE 1.0 10/08/2021    GLUCOSE 111 (H) 10/08/2021    CALCIUM 10.0 10/08/2021    LABGLOM >60 10/08/2021    GFRAA >60 10/08/2021

## 2023-12-06 RX ORDER — IBUPROFEN 800 MG/1
TABLET ORAL
Qty: 90 TABLET | Refills: 0 | Status: SHIPPED | OUTPATIENT
Start: 2023-12-06

## 2023-12-06 NOTE — TELEPHONE ENCOUNTER
Medication:   Requested Prescriptions     Pending Prescriptions Disp Refills    ibuprofen (ADVIL;MOTRIN) 800 MG tablet [Pharmacy Med Name: IBUPROFEN 800 MG TABLET] 90 tablet 0     Sig: TAKE 1 TABLET BY MOUTH 3 TIMES DAILY WITH MEALS.         Last Filled:  11/24/23    Patient Phone Number: [phone] (home)     Last appt: 5/15/2023   Next appt: Visit date not found    Last OARRS:        No data to display

## 2024-01-01 NOTE — TELEPHONE ENCOUNTER
History was provided by the parents.    Claus Baron is a 6 days female who was brought in for this well child visit.    Current Issues/Interval History:  Current concerns include: none    Review of Nutrition:  Current diet:  EBM (mom's breast milk)  Current feeding patterns: 1-2oz every 2-3hrs  Difficulties with feeding? no  Birth Weight: 3.39 kg (7 lb 7.6 oz)  Weight change since birth: -6%  Gained 42.5g/day since saturday    Review of Elimination:  Current stooling frequency: with every feeding  Current number of voids per day:   lots      All pertinent review of systems negative except for listed in HPI.     Objective:       General:   alert, appears stated age and cooperative   Skin:   normal   Head:   normal fontanelles   Eyes:   sclerae white, normal corneal light reflex   Ears:   normal bilaterally   Mouth:   No perioral or gingival cyanosis or lesions.  Tongue is normal in appearance.   Lungs:   clear to auscultation bilaterally   Heart:   regular rate and rhythm, S1, S2 normal, no murmur, click, rub or gallop   Abdomen:   soft, non-tender; bowel sounds normal; no masses,  no organomegaly   Cord stump:  cord stump absent   Screening DDH:   Ortolani's and Norris's signs absent bilaterally, leg length symmetrical and thigh & gluteal folds symmetrical   :   normal female   Femoral pulses:   present bilaterally   Extremities:   extremities normal, atraumatic, no cyanosis or edema   Neuro:   alert and moves all extremities spontaneously       Assessment:    jaundice  -     POCT bilirubinometry    Weight check in breast-fed  under 8 days old      Plan:     Weight - great weight gain. Continue to feed every 2-3 hours, increase amount as desired. Follow up at 2 week check.  TcB low risk -8. No further checks needed.       Patient was referred to Dr. Theresa Graves for cervical  MRI ordered  PT noted x 2 months. No PT notes in [facility] HX negative  Patient is approved to be seen at this time. Message sent to schedulers   Please notify referring physician if denied.

## 2024-01-07 DIAGNOSIS — M50.90 CERVICAL DISC DISORDER, UNSPECIFIED, UNSPECIFIED CERVICAL REGION: ICD-10-CM

## 2024-01-08 RX ORDER — MELOXICAM 15 MG/1
TABLET ORAL
Qty: 30 TABLET | Refills: 2 | Status: SHIPPED | OUTPATIENT
Start: 2024-01-08

## 2024-01-08 NOTE — TELEPHONE ENCOUNTER
Medication:   Requested Prescriptions     Pending Prescriptions Disp Refills    meloxicam (MOBIC) 15 MG tablet [Pharmacy Med Name: MELOXICAM 15 MG TABLET] 30 tablet 2     Sig: TAKE 1 TABLET BY MOUTH EVERY DAY AS NEEDED FOR PAIN        Last Filled:  11/06/2023 #30 2rf      Patient Phone Number: [phone] (home)     Last appt: 5/15/2023   Next appt: Visit date not found    Last OARRS:        No data to display

## 2024-03-16 DIAGNOSIS — I10 PRIMARY HYPERTENSION: ICD-10-CM

## 2024-03-16 DIAGNOSIS — M1A.0790 CHRONIC GOUT OF ANKLE, UNSPECIFIED CAUSE, UNSPECIFIED LATERALITY: ICD-10-CM

## 2024-03-16 DIAGNOSIS — E11.9 TYPE 2 DIABETES MELLITUS WITHOUT COMPLICATION, WITHOUT LONG-TERM CURRENT USE OF INSULIN (HCC): ICD-10-CM

## 2024-03-16 DIAGNOSIS — J30.9 CHRONIC ALLERGIC RHINITIS: ICD-10-CM

## 2024-03-16 DIAGNOSIS — F51.01 PRIMARY INSOMNIA: ICD-10-CM

## 2024-03-18 RX ORDER — ALBUTEROL SULFATE 90 UG/1
AEROSOL, METERED RESPIRATORY (INHALATION)
Qty: 18 EACH | Refills: 3 | Status: SHIPPED | OUTPATIENT
Start: 2024-03-18

## 2024-03-18 RX ORDER — TRAZODONE HYDROCHLORIDE 50 MG/1
50 TABLET ORAL NIGHTLY
Qty: 90 TABLET | Refills: 1 | Status: SHIPPED | OUTPATIENT
Start: 2024-03-18

## 2024-03-18 RX ORDER — LISINOPRIL 5 MG/1
TABLET ORAL
Qty: 90 TABLET | Refills: 1 | Status: SHIPPED | OUTPATIENT
Start: 2024-03-18

## 2024-03-18 RX ORDER — ALLOPURINOL 100 MG/1
TABLET ORAL
Qty: 180 TABLET | Refills: 1 | Status: SHIPPED | OUTPATIENT
Start: 2024-03-18

## 2024-03-18 RX ORDER — BLOOD SUGAR DIAGNOSTIC
STRIP MISCELLANEOUS
Qty: 50 STRIP | Refills: 0 | Status: SHIPPED | OUTPATIENT
Start: 2024-03-18

## 2024-03-18 NOTE — TELEPHONE ENCOUNTER
Medication:   Requested Prescriptions     Pending Prescriptions Disp Refills    traZODone (DESYREL) 50 MG tablet [Pharmacy Med Name: TRAZODONE 50 MG TABLET] 90 tablet 1     Sig: TAKE 1 TABLET BY MOUTH EVERY DAY AT NIGHT    ONETOUCH ULTRA strip [Pharmacy Med Name: ONE TOUCH ULTRA BLUE TEST STRP] 50 strip 0     Sig: USE DAILY AS DIRECTED    lisinopril (PRINIVIL;ZESTRIL) 5 MG tablet [Pharmacy Med Name: LISINOPRIL 5 MG TABLET] 90 tablet 1     Sig: TAKE 1 TABLET BY MOUTH EVERY DAY        Last Filled:  12/1/2023    Patient Phone Number: [phone] (home)     Last appt: 5/15/2023   Next appt: 3/16/2024    Last OARRS:        No data to display

## 2024-03-18 NOTE — TELEPHONE ENCOUNTER
Medication:   Requested Prescriptions     Pending Prescriptions Disp Refills    VENTOLIN  (90 Base) MCG/ACT inhaler [Pharmacy Med Name: VENTOLIN HFA 90 MCG INHALER] 18 each 3     Sig: TAKE 2 PUFFS BY MOUTH EVERY 6 HOURS AS NEEDED FOR WHEEZE    allopurinol (ZYLOPRIM) 100 MG tablet [Pharmacy Med Name: ALLOPURINOL 100 MG TABLET] 180 tablet 1     Sig: TAKE 1 TABLET BY MOUTH TWICE A DAY        Last Filled:  4/6/2023    Patient Phone Number: [phone] (home)     Last appt: 5/15/2023   Next appt: Visit date not found    Last OARRS:        No data to display

## 2024-05-05 DIAGNOSIS — E11.9 TYPE 2 DIABETES MELLITUS WITHOUT COMPLICATION, WITHOUT LONG-TERM CURRENT USE OF INSULIN (HCC): ICD-10-CM

## 2024-05-06 RX ORDER — BLOOD SUGAR DIAGNOSTIC
STRIP MISCELLANEOUS
Qty: 50 STRIP | Refills: 0 | Status: SHIPPED | OUTPATIENT
Start: 2024-05-06

## 2024-05-06 NOTE — TELEPHONE ENCOUNTER
Medication:   Requested Prescriptions     Pending Prescriptions Disp Refills    ONETOUCH ULTRA TEST strip [Pharmacy Med Name: ONE TOUCH ULTRA BLUE TEST STRP] 50 strip 0     Sig: USE DAILY AS DIRECTED        Last Filled:      Patient Phone Number: [phone] (home)     Last appt: 5/15/2023   Next appt: Visit date not found    Last OARRS:        No data to display

## 2024-06-26 DIAGNOSIS — J30.9 CHRONIC ALLERGIC RHINITIS: ICD-10-CM

## 2024-06-26 DIAGNOSIS — M1A.0790 CHRONIC GOUT OF ANKLE, UNSPECIFIED CAUSE, UNSPECIFIED LATERALITY: ICD-10-CM

## 2024-06-26 RX ORDER — ALLOPURINOL 100 MG/1
TABLET ORAL
Qty: 60 TABLET | Refills: 0 | Status: SHIPPED | OUTPATIENT
Start: 2024-06-26

## 2024-06-26 RX ORDER — ALBUTEROL SULFATE 90 UG/1
AEROSOL, METERED RESPIRATORY (INHALATION)
Qty: 18 EACH | Refills: 0 | Status: SHIPPED | OUTPATIENT
Start: 2024-06-26

## 2024-06-26 NOTE — TELEPHONE ENCOUNTER
Medication:   Requested Prescriptions     Pending Prescriptions Disp Refills    allopurinol (ZYLOPRIM) 100 MG tablet [Pharmacy Med Name: ALLOPURINOL 100 MG TABLET] 180 tablet 1     Sig: TAKE 1 TABLET BY MOUTH TWICE A DAY    albuterol sulfate HFA (PROVENTIL;VENTOLIN;PROAIR) 108 (90 Base) MCG/ACT inhaler [Pharmacy Med Name: ALBUTEROL HFA (VENTOLIN) INH] 18 each 3     Sig: INHALE 2 PUFFS BY MOUTH EVERY 6 HOURS AS NEEDED FOR WHEEZE        Last Filled:  Allopurinol 03/18/2024 #180 1rf   Ventolin 03/18/2024 #18g 3rf     Patient Phone Number: [phone] (home)     Last appt: 5/15/2023   Next appt: 07/16/2024: PE     Last OARRS:        No data to display

## 2024-06-28 DIAGNOSIS — E11.9 TYPE 2 DIABETES MELLITUS WITHOUT COMPLICATION, WITHOUT LONG-TERM CURRENT USE OF INSULIN (HCC): ICD-10-CM

## 2024-06-28 RX ORDER — BLOOD SUGAR DIAGNOSTIC
STRIP MISCELLANEOUS
Qty: 50 STRIP | Refills: 0 | Status: SHIPPED | OUTPATIENT
Start: 2024-06-28

## 2024-06-28 NOTE — TELEPHONE ENCOUNTER
Medication:   Requested Prescriptions     Pending Prescriptions Disp Refills    ONETOUCH ULTRA TEST strip [Pharmacy Med Name: ONE TOUCH ULTRA BLUE TEST STRP] 50 strip 0     Sig: USE DAILY AS DIRECTED       Last Filled:  05/06/2024 #50 0rf     Patient Phone Number: [phone] (home)     Last appt: 5/15/2023   Next appt: 7/16/2024    Last Labs DM:   Lab Results   Component Value Date/Time    LABA1C 6.6 04/04/2023 12:29 PM

## 2024-07-16 ENCOUNTER — OFFICE VISIT (OUTPATIENT)
Dept: FAMILY MEDICINE CLINIC | Age: 54
End: 2024-07-16
Payer: COMMERCIAL

## 2024-07-16 VITALS
WEIGHT: 220 LBS | OXYGEN SATURATION: 98 % | SYSTOLIC BLOOD PRESSURE: 95 MMHG | HEIGHT: 72 IN | HEART RATE: 77 BPM | TEMPERATURE: 97.3 F | BODY MASS INDEX: 29.8 KG/M2 | DIASTOLIC BLOOD PRESSURE: 63 MMHG

## 2024-07-16 DIAGNOSIS — F51.01 PRIMARY INSOMNIA: ICD-10-CM

## 2024-07-16 DIAGNOSIS — E11.42 TYPE 2 DIABETES MELLITUS WITH DIABETIC POLYNEUROPATHY, WITHOUT LONG-TERM CURRENT USE OF INSULIN (HCC): ICD-10-CM

## 2024-07-16 DIAGNOSIS — J30.9 CHRONIC ALLERGIC RHINITIS: ICD-10-CM

## 2024-07-16 DIAGNOSIS — Z00.00 ANNUAL PHYSICAL EXAM: ICD-10-CM

## 2024-07-16 DIAGNOSIS — E78.2 MIXED HYPERLIPIDEMIA: ICD-10-CM

## 2024-07-16 DIAGNOSIS — M1A.0790 CHRONIC GOUT OF ANKLE, UNSPECIFIED CAUSE, UNSPECIFIED LATERALITY: ICD-10-CM

## 2024-07-16 DIAGNOSIS — I10 PRIMARY HYPERTENSION: Primary | ICD-10-CM

## 2024-07-16 LAB — HBA1C MFR BLD: 8.6 %

## 2024-07-16 PROCEDURE — 83036 HEMOGLOBIN GLYCOSYLATED A1C: CPT | Performed by: FAMILY MEDICINE

## 2024-07-16 PROCEDURE — 3078F DIAST BP <80 MM HG: CPT | Performed by: FAMILY MEDICINE

## 2024-07-16 PROCEDURE — 3074F SYST BP LT 130 MM HG: CPT | Performed by: FAMILY MEDICINE

## 2024-07-16 PROCEDURE — 99396 PREV VISIT EST AGE 40-64: CPT | Performed by: FAMILY MEDICINE

## 2024-07-16 RX ORDER — GABAPENTIN 300 MG/1
CAPSULE ORAL
Qty: 30 CAPSULE | Refills: 1 | Status: SHIPPED | OUTPATIENT
Start: 2024-07-16 | End: 2026-06-13

## 2024-07-16 RX ORDER — HYDROXYZINE PAMOATE 50 MG/1
50 CAPSULE ORAL 3 TIMES DAILY PRN
COMMUNITY
Start: 2024-06-24 | End: 2024-07-16 | Stop reason: SDUPTHER

## 2024-07-16 RX ORDER — GLIPIZIDE 10 MG/1
10 TABLET, FILM COATED, EXTENDED RELEASE ORAL DAILY
Qty: 30 TABLET | Refills: 3 | Status: SHIPPED | OUTPATIENT
Start: 2024-07-16

## 2024-07-16 RX ORDER — MELOXICAM 15 MG/1
15 TABLET ORAL DAILY PRN
Qty: 30 TABLET | Refills: 2 | Status: CANCELLED | OUTPATIENT
Start: 2024-07-16

## 2024-07-16 RX ORDER — SERTRALINE HYDROCHLORIDE 100 MG/1
100 TABLET, FILM COATED ORAL DAILY
COMMUNITY
Start: 2024-06-24

## 2024-07-16 RX ORDER — FLUTICASONE PROPIONATE 50 MCG
SPRAY, SUSPENSION (ML) NASAL
Qty: 1 EACH | Refills: 2 | Status: SHIPPED | OUTPATIENT
Start: 2024-07-16

## 2024-07-16 RX ORDER — TRAZODONE HYDROCHLORIDE 50 MG/1
50 TABLET ORAL NIGHTLY
Qty: 90 TABLET | Refills: 1 | Status: CANCELLED | OUTPATIENT
Start: 2024-07-16

## 2024-07-16 RX ORDER — IBUPROFEN 800 MG/1
TABLET ORAL
Qty: 90 TABLET | Refills: 0 | Status: SHIPPED | OUTPATIENT
Start: 2024-07-16

## 2024-07-16 RX ORDER — DILTIAZEM HYDROCHLORIDE 60 MG/1
TABLET, FILM COATED ORAL
Qty: 10.2 G | Refills: 2 | Status: SHIPPED | OUTPATIENT
Start: 2024-07-16

## 2024-07-16 RX ORDER — TRAZODONE HYDROCHLORIDE 50 MG/1
50 TABLET ORAL NIGHTLY
Qty: 90 TABLET | Refills: 1 | Status: SHIPPED | OUTPATIENT
Start: 2024-07-16

## 2024-07-16 RX ORDER — LISINOPRIL 5 MG/1
5 TABLET ORAL DAILY
Qty: 45 TABLET | Refills: 1 | Status: SHIPPED | OUTPATIENT
Start: 2024-07-16

## 2024-07-16 RX ORDER — HYDROXYZINE PAMOATE 50 MG/1
50 CAPSULE ORAL 3 TIMES DAILY PRN
Qty: 90 CAPSULE | Refills: 0 | Status: SHIPPED | OUTPATIENT
Start: 2024-07-16

## 2024-07-16 RX ORDER — LORATADINE 10 MG/1
10 TABLET ORAL DAILY
COMMUNITY
Start: 2024-06-26

## 2024-07-16 RX ORDER — LISINOPRIL 5 MG/1
5 TABLET ORAL DAILY
Qty: 90 TABLET | Refills: 1 | Status: CANCELLED | OUTPATIENT
Start: 2024-07-16

## 2024-07-16 SDOH — ECONOMIC STABILITY: FOOD INSECURITY: WITHIN THE PAST 12 MONTHS, THE FOOD YOU BOUGHT JUST DIDN'T LAST AND YOU DIDN'T HAVE MONEY TO GET MORE.: NEVER TRUE

## 2024-07-16 SDOH — ECONOMIC STABILITY: FOOD INSECURITY: WITHIN THE PAST 12 MONTHS, YOU WORRIED THAT YOUR FOOD WOULD RUN OUT BEFORE YOU GOT MONEY TO BUY MORE.: NEVER TRUE

## 2024-07-16 SDOH — ECONOMIC STABILITY: INCOME INSECURITY: HOW HARD IS IT FOR YOU TO PAY FOR THE VERY BASICS LIKE FOOD, HOUSING, MEDICAL CARE, AND HEATING?: NOT HARD AT ALL

## 2024-07-16 ASSESSMENT — PATIENT HEALTH QUESTIONNAIRE - PHQ9
4. FEELING TIRED OR HAVING LITTLE ENERGY: SEVERAL DAYS
SUM OF ALL RESPONSES TO PHQ QUESTIONS 1-9: 7
10. IF YOU CHECKED OFF ANY PROBLEMS, HOW DIFFICULT HAVE THESE PROBLEMS MADE IT FOR YOU TO DO YOUR WORK, TAKE CARE OF THINGS AT HOME, OR GET ALONG WITH OTHER PEOPLE: SOMEWHAT DIFFICULT
9. THOUGHTS THAT YOU WOULD BE BETTER OFF DEAD, OR OF HURTING YOURSELF: NOT AT ALL
SUM OF ALL RESPONSES TO PHQ QUESTIONS 1-9: 7
5. POOR APPETITE OR OVEREATING: SEVERAL DAYS
1. LITTLE INTEREST OR PLEASURE IN DOING THINGS: SEVERAL DAYS
SUM OF ALL RESPONSES TO PHQ QUESTIONS 1-9: 7
2. FEELING DOWN, DEPRESSED OR HOPELESS: SEVERAL DAYS
3. TROUBLE FALLING OR STAYING ASLEEP: MORE THAN HALF THE DAYS
SUM OF ALL RESPONSES TO PHQ QUESTIONS 1-9: 7
7. TROUBLE CONCENTRATING ON THINGS, SUCH AS READING THE NEWSPAPER OR WATCHING TELEVISION: NOT AT ALL
8. MOVING OR SPEAKING SO SLOWLY THAT OTHER PEOPLE COULD HAVE NOTICED. OR THE OPPOSITE, BEING SO FIGETY OR RESTLESS THAT YOU HAVE BEEN MOVING AROUND A LOT MORE THAN USUAL: NOT AT ALL
6. FEELING BAD ABOUT YOURSELF - OR THAT YOU ARE A FAILURE OR HAVE LET YOURSELF OR YOUR FAMILY DOWN: SEVERAL DAYS
SUM OF ALL RESPONSES TO PHQ9 QUESTIONS 1 & 2: 2

## 2024-07-16 ASSESSMENT — ANXIETY QUESTIONNAIRES
GAD7 TOTAL SCORE: 6
7. FEELING AFRAID AS IF SOMETHING AWFUL MIGHT HAPPEN: SEVERAL DAYS
IF YOU CHECKED OFF ANY PROBLEMS ON THIS QUESTIONNAIRE, HOW DIFFICULT HAVE THESE PROBLEMS MADE IT FOR YOU TO DO YOUR WORK, TAKE CARE OF THINGS AT HOME, OR GET ALONG WITH OTHER PEOPLE: SOMEWHAT DIFFICULT
6. BECOMING EASILY ANNOYED OR IRRITABLE: SEVERAL DAYS
1. FEELING NERVOUS, ANXIOUS, OR ON EDGE: SEVERAL DAYS
3. WORRYING TOO MUCH ABOUT DIFFERENT THINGS: SEVERAL DAYS
2. NOT BEING ABLE TO STOP OR CONTROL WORRYING: SEVERAL DAYS
4. TROUBLE RELAXING: SEVERAL DAYS
5. BEING SO RESTLESS THAT IT IS HARD TO SIT STILL: NOT AT ALL

## 2024-07-16 NOTE — PROGRESS NOTES
Chief Complaint: Annual Exam, Diabetes (Type 2 Diabetes F/U; last A1C= 6.6% on 4/4/23), and Hypertension       HPI: Paul Crandall is a 52 y.o. male with history of type 2 diabetes, hypertension, insomnia, depression, gout coming annual physical exam.    Gout: he was taking allopurinol 100 mg daily.  Intermittently still continues to have swelling his ankle.       He has history of type 2 diabetes is worsening.  His A1c is 8.6.  He could not tolerate metformin.  Hence he has not been taking the medication.    He has history of diabetic polyneuropathy.  He has been currently taking gabapentin 300 mg once at nighttime.  It helps him.     His blood pressure is well controlled with lisinopril 5 mg daily.      He has been taking trazodone 50 mg daily to help with sleep.  And it is helping.  Denies any concern.     He has been taking Zoloft 100 mg enedelia for depression denies any concern.     He has seen allergist and was prescribed Symbicort and he has been doing better.  He has also been taking Lipitor 40 mg daily for-his hyperlipidemia.  Denies any side effects from the medication.      Patient's problem list, medications, allergies, past medical, surgical, social and family histories were reviewed and updated as appropriate.     Current Outpatient Medications   Medication Sig Dispense Refill    loratadine (CLARITIN) 10 MG tablet Take 1 tablet by mouth daily      sertraline (ZOLOFT) 100 MG tablet Take 1 tablet by mouth daily      diclofenac sodium (VOLTAREN) 1 % GEL Apply 1 g topically 4 times daily as needed for Pain 100 g 5    fluticasone (FLONASE) 50 MCG/ACT nasal spray SPRAY 1 SPRAY INTO EACH NOSTRIL EVERY DAY 1 each 2    gabapentin (NEURONTIN) 300 MG capsule TAKE 1 CAPSULE BY MOUTH EVERY DAY AT NIGHT 30 capsule 1    ibuprofen (ADVIL;MOTRIN) 800 MG tablet TAKE 1 TABLET BY MOUTH 3 TIMES DAILY WITH MEALS. 90 tablet 0    SYMBICORT 80-4.5 MCG/ACT AERO INHALE 2 PUFFS BY MOUTH TWICE DAILY. RINSE MOUTH AFTER EACH USE.

## 2024-09-09 RX ORDER — IBUPROFEN 800 MG/1
TABLET, FILM COATED ORAL
Qty: 90 TABLET | Refills: 0 | Status: SHIPPED | OUTPATIENT
Start: 2024-09-09

## 2024-10-07 RX ORDER — IBUPROFEN 800 MG/1
TABLET, FILM COATED ORAL
Qty: 90 TABLET | Refills: 0 | Status: SHIPPED | OUTPATIENT
Start: 2024-10-07

## 2024-10-07 RX ORDER — DILTIAZEM HYDROCHLORIDE 60 MG/1
TABLET, FILM COATED ORAL
Qty: 10.2 EACH | Refills: 2 | Status: SHIPPED | OUTPATIENT
Start: 2024-10-07

## 2024-10-07 NOTE — TELEPHONE ENCOUNTER
Medication:   Requested Prescriptions     Pending Prescriptions Disp Refills    SYMBICORT 80-4.5 MCG/ACT AERO [Pharmacy Med Name: SYMBICORT 80-4.5 MCG INHALER] 10.2 each 2     Sig: INHALE 2 PUFFS BY MOUTH TWICE DAILY. RINSE MOUTH AFTER EACH USE. (REPLACES FLOVENT)        Last Filled:  07/16/2024 #1 2rf    Patient Phone Number: [phone] (home)     Last appt: 7/16/2024   Next appt: 10/16/2024    Last OARRS:        No data to display

## 2024-10-07 NOTE — TELEPHONE ENCOUNTER
Medication:   Requested Prescriptions     Pending Prescriptions Disp Refills    ibuprofen (ADVIL;MOTRIN) 800 MG tablet [Pharmacy Med Name: IBUPROFEN 800 MG TABLET] 90 tablet 0     Sig: TAKE 1 TABLET BY MOUTH 3 TIMES DAILY WITH MEALS.        Last Filled:  09/09/2024 #90 0rf     Patient Phone Number: [phone] (home)     Last appt: 7/16/2024   Next appt: 10/16/2024    Last OARRS:        No data to display

## 2024-10-16 ENCOUNTER — OFFICE VISIT (OUTPATIENT)
Dept: FAMILY MEDICINE CLINIC | Age: 54
End: 2024-10-16
Payer: COMMERCIAL

## 2024-10-16 VITALS
OXYGEN SATURATION: 98 % | SYSTOLIC BLOOD PRESSURE: 143 MMHG | TEMPERATURE: 97.9 F | WEIGHT: 209.6 LBS | BODY MASS INDEX: 28.39 KG/M2 | HEIGHT: 72 IN | DIASTOLIC BLOOD PRESSURE: 98 MMHG | HEART RATE: 97 BPM

## 2024-10-16 DIAGNOSIS — E78.2 MIXED HYPERLIPIDEMIA: ICD-10-CM

## 2024-10-16 DIAGNOSIS — Z23 NEED FOR VACCINATION: Primary | ICD-10-CM

## 2024-10-16 DIAGNOSIS — I10 PRIMARY HYPERTENSION: ICD-10-CM

## 2024-10-16 DIAGNOSIS — M1A.0710 CHRONIC GOUT OF RIGHT ANKLE, UNSPECIFIED CAUSE: ICD-10-CM

## 2024-10-16 DIAGNOSIS — E11.42 TYPE 2 DIABETES MELLITUS WITH DIABETIC POLYNEUROPATHY, WITHOUT LONG-TERM CURRENT USE OF INSULIN (HCC): ICD-10-CM

## 2024-10-16 PROCEDURE — 3080F DIAST BP >= 90 MM HG: CPT | Performed by: FAMILY MEDICINE

## 2024-10-16 PROCEDURE — 99214 OFFICE O/P EST MOD 30 MIN: CPT | Performed by: FAMILY MEDICINE

## 2024-10-16 PROCEDURE — G8427 DOCREV CUR MEDS BY ELIG CLIN: HCPCS | Performed by: FAMILY MEDICINE

## 2024-10-16 PROCEDURE — 3077F SYST BP >= 140 MM HG: CPT | Performed by: FAMILY MEDICINE

## 2024-10-16 PROCEDURE — 90661 CCIIV3 VAC ABX FR 0.5 ML IM: CPT | Performed by: FAMILY MEDICINE

## 2024-10-16 PROCEDURE — 2022F DILAT RTA XM EVC RTNOPTHY: CPT | Performed by: FAMILY MEDICINE

## 2024-10-16 PROCEDURE — G8419 CALC BMI OUT NRM PARAM NOF/U: HCPCS | Performed by: FAMILY MEDICINE

## 2024-10-16 PROCEDURE — 1036F TOBACCO NON-USER: CPT | Performed by: FAMILY MEDICINE

## 2024-10-16 PROCEDURE — G8484 FLU IMMUNIZE NO ADMIN: HCPCS | Performed by: FAMILY MEDICINE

## 2024-10-16 PROCEDURE — 3052F HG A1C>EQUAL 8.0%<EQUAL 9.0%: CPT | Performed by: FAMILY MEDICINE

## 2024-10-16 PROCEDURE — 3017F COLORECTAL CA SCREEN DOC REV: CPT | Performed by: FAMILY MEDICINE

## 2024-10-16 PROCEDURE — 90471 IMMUNIZATION ADMIN: CPT | Performed by: FAMILY MEDICINE

## 2024-10-16 RX ORDER — GABAPENTIN 300 MG/1
CAPSULE ORAL
Qty: 30 CAPSULE | Refills: 1 | Status: SHIPPED | OUTPATIENT
Start: 2024-10-16 | End: 2026-09-13

## 2024-10-16 RX ORDER — DOCUSATE SODIUM 100 MG/1
100 CAPSULE, LIQUID FILLED ORAL DAILY PRN
Qty: 30 CAPSULE | Refills: 0 | Status: SHIPPED | OUTPATIENT
Start: 2024-10-16 | End: 2024-11-15

## 2024-10-16 RX ORDER — ATORVASTATIN CALCIUM 40 MG/1
40 TABLET, FILM COATED ORAL DAILY
Qty: 90 TABLET | Refills: 3 | Status: SHIPPED | OUTPATIENT
Start: 2024-10-16

## 2024-10-16 NOTE — PROGRESS NOTES
to accommodate PRN testing needs. 100 strip 1    Hypertonic Nasal Wash (SINUS RINSE NA) by Nasal route        No current facility-administered medications for this visit.       Social History     Tobacco Use    Smoking status: Never    Smokeless tobacco: Never   Substance Use Topics    Alcohol use: Not Currently            Review of Systems:  Constitutional: Negative for appetite change, fatigue, fever and unexpected weight change.   HENT: Negative   Respiratory: Negative for cough, chest tightness, shortness of breath and wheezing.    Cardiovascular: Negative for chest pain, palpitations and leg swelling.   Gastrointestinal: Negative   Genitourinary: Negative for difficulty urinating, flank pain, frequency, hematuria and urgency.   Musculoskeletal: As mentioned above  Neurological: Negative   Psychiatric/Behavioral: Negative         Objective:     Vitals:    10/16/24 1240 10/16/24 1255   BP: (!) 140/90 (!) 143/98   Pulse: 97    Temp: 97.9 °F (36.6 °C)    TempSrc: Temporal    SpO2: 98%    Weight: 95.1 kg (209 lb 9.6 oz)    Height: 1.829 m (6')      Body mass index is 28.43 kg/m².     Wt Readings from Last 3 Encounters:   10/16/24 95.1 kg (209 lb 9.6 oz)   07/16/24 99.8 kg (220 lb)   05/15/23 97.5 kg (215 lb)     BP Readings from Last 3 Encounters:   10/16/24 (!) 143/98   07/16/24 95/63   05/15/23 (!) 142/88       Physical exam:  Constitutional: he is oriented to person, place, and time.he appears well-developed and well-nourished. No distress.   Neck: Normal range of motion. No JVD present. No tracheal deviation present. No thyromegaly present.   Cardiovascular: Normal rate, regular rhythm, normal heart sounds and intact distal pulses.  No murmur heard.  Pulmonary/Chest: Effort normal and breath sounds normal.  Abdominal: Soft. Bowel sounds are normal..   Musculoskeletal: Normal spine exam and normal upper and lower extremity strength  Neurological:he is alert and oriented to person, place, and time.he  has normal

## 2024-10-24 NOTE — PROGRESS NOTES
Chief Complaint: New Patient ([facility] care); Back Pain (patient has lost alot of weight and states that his neck and back has been hurting); and Neck Pain (patient has lost alot of weight and states that his neck and back has been hurting.)       HPI:  Dewey Valdivia is a 45 y.o. male here to establish care. He has history of glaucoma and follows up with ophthalmologist.    He also has history of gout and has flareups on his left greater toe. And he has been on allopurinol 100 mg daily. Since 1 year he has not been having any flareups. He has concerns with his constipation. He has been eating high-fiber  diet but still struggles to have a bowel movement  He never had any colon screening  Denies any blood in the stools  Denies any pain in his lower abdomen    He has high cholesterol  Is due for his checkup  He has been on Lipitor 40 mg daily    He has low back pain and neck pain  Which is been ongoing intermittently  He had [facility] done at [facility]  He was advised for physical therapy  The physical therapy did not help him much  And the pain comes intermittently  Denies any neurological deficit    ROS:  Constitutional: Negative for appetite change, fatigue, fever and unexpected weight change. HENT: Negative  Eyes: Negative   Respiratory: Negative for cough, chest tightness, shortness of breath and wheezing. Cardiovascular: Negative for chest pain, palpitations and leg swelling. Gastrointestinal: As mentioned above   Genitourinary: Negative for difficulty urinating, flank pain, frequency, hematuria and urgency. Musculoskeletal: As mentioned above  Skin: Negative for color change, pallor, rash and wound. Neurological: Negative for dizziness, tremors, seizures, syncope, facial asymmetry, speech difficulty, weakness, light-headedness, numbness and headaches. Psychiatric/Behavioral: Negative for agitation, confusion, self-injury, sleep disturbance and suicidal ideas. The patient is not nervous/anxious. Patient's problem list, medications, allergies, past medical, surgical, social and family histories were reviewed and updated as appropriate. Current Outpatient Medications   Medication Sig Dispense Refill    cetirizine (ZYRTEC) 10 MG tablet Take 10 mg by mouth daily      polyethyl glycol-propyl glycol 0.4-0.3 % (SYSTANE) 0.4-0.3 % ophthalmic solution 1 drop as needed for Dry Eyes      ibuprofen (ADVIL;MOTRIN) 600 MG tablet Take 600 mg by mouth every 6 hours as needed for Pain      diclofenac sodium (VOLTAREN) 1 % GEL Apply 2 g topically 2 times daily      albuterol sulfate HFA (VENTOLIN HFA) 108 (90 Base) MCG/ACT inhaler Inhale 2 puffs into the lungs every 6 hours as needed for Wheezing      fluticasone (FLONASE) 50 MCG/ACT nasal spray 1 spray by Each Nostril route daily      Hypertonic Nasal Wash (SINUS RINSE NA) by Nasal route      ibuprofen (ADVIL;MOTRIN) 600 MG tablet Take 1 tablet by mouth every 8 hours as needed for Pain 30 tablet 0    atorvastatin (LIPITOR) 20 MG tablet Take 20 mg by mouth daily      allopurinol (ZYLOPRIM) 100 MG tablet Take 100 mg by mouth daily      cyclobenzaprine (FLEXERIL) 10 MG tablet Take 10 mg by mouth 3 times daily as needed for Muscle spasms       No current facility-administered medications for this visit. Social History     Tobacco Use    Smoking status: Never Smoker    Smokeless tobacco: Never Used   Substance Use Topics    Alcohol use: Not Currently        Objective:     Vitals:    07/06/20 1252   BP: 130/82   Pulse: 77   SpO2: 98%   Weight: 212 lb 3.2 oz (96.3 kg)   Height: 5' 10\" (1.778 m)     Body mass index is 30.45 kg/m². Wt Readings from Last 3 Encounters:   07/06/20 212 lb 3.2 oz (96.3 kg)   06/06/20 220 lb (99.8 kg)     BP Readings from Last 3 Encounters:   07/06/20 130/82   06/06/20 (!) 157/102       Physical exam:  Constitutional: he is oriented to person, place, and time. he appears well-developed and well-nourished. No distress. Cardiovascular: Normal rate, regular rhythm, normal heart sounds and intact distal pulses. No murmur heard. Pulmonary/Chest: Effort normal and breath sounds normal. No stridor. No respiratory distress. he has no wheezes. he has no rales. heexhibits no tenderness. Abdominal: Soft. Bowel sounds are normal. he exhibits no distension and no mass. There is  Tenderness in lower abdomen. There is no rebound and no guarding. Musculoskeletal: Normal cervical spine and lumbar spine exam  Normal bilateral upper and lower limb strength and reflexes  Tenderness in his left greater toe  But no tophi noted  No swelling or redness noted. Lymphadenopathy:     he has no cervical adenopathy. Neurological:he is alert and oriented to person, place, and time. he has gross neurological exam normal with normal strength and normal gait  Psychiatric: he has a normal mood and affect. his   behavior is normal.      Assessment/Plan:   1. Glaucoma of left eye, unspecified glaucoma type  Stable and follows up with ophthalmologist    2. Idiopathic chronic gout of left foot without tophus  On allopurinol and we will continue  - Uric Acid; Future    3. Physical exam, annual  - CBC Auto Differential; Future  - Lipid Panel; Future  - Basic Metabolic Panel; Future    4. Screening for colon cancer  - AFL - Neisha Oswald MD, Gastroenterology, [facility]    5. Constipation, unspecified constipation type  - TSH with Reflex;  Future  Advised about the diet and we will also check for tsh    6 lower back pain  Mostly musculoskeletal and advised for flexeril and nsaid and stretching exercise     7 Hyperlipidemia  We will get the labs and continue the lipitor 40 mg daily      Jayson Stover  7/6/2020 2:58 PM Opt out

## 2024-11-07 DIAGNOSIS — E11.42 TYPE 2 DIABETES MELLITUS WITH DIABETIC POLYNEUROPATHY, WITHOUT LONG-TERM CURRENT USE OF INSULIN (HCC): ICD-10-CM

## 2024-11-07 RX ORDER — GLIPIZIDE 10 MG/1
10 TABLET, FILM COATED, EXTENDED RELEASE ORAL DAILY
Qty: 90 TABLET | Refills: 1 | Status: SHIPPED | OUTPATIENT
Start: 2024-11-07

## 2024-11-07 NOTE — TELEPHONE ENCOUNTER
Medication:   Requested Prescriptions     Pending Prescriptions Disp Refills    glipiZIDE (GLUCOTROL XL) 10 MG extended release tablet [Pharmacy Med Name: GLIPIZIDE ER 10 MG TABLET] 90 tablet 1     Sig: TAKE 1 TABLET BY MOUTH EVERY DAY       Last Filled:  07/16/2024 #30 3rf    Patient Phone Number: [phone] (home)     Last appt: 10/16/2024   Next appt: 4/16/2025    Last Labs DM:   Lab Results   Component Value Date/Time    LABA1C 8.6 07/16/2024 11:42 AM

## 2024-11-27 DIAGNOSIS — I10 PRIMARY HYPERTENSION: ICD-10-CM

## 2024-11-27 RX ORDER — LISINOPRIL 5 MG/1
TABLET ORAL
Qty: 90 TABLET | Refills: 1 | Status: SHIPPED | OUTPATIENT
Start: 2024-11-27

## 2024-12-31 DIAGNOSIS — J30.9 CHRONIC ALLERGIC RHINITIS: ICD-10-CM

## 2025-01-02 RX ORDER — FLUTICASONE PROPIONATE 50 MCG
SPRAY, SUSPENSION (ML) NASAL
Qty: 1 EACH | Refills: 2 | Status: SHIPPED | OUTPATIENT
Start: 2025-01-02

## 2025-01-02 NOTE — TELEPHONE ENCOUNTER
Medication:   Requested Prescriptions     Pending Prescriptions Disp Refills    fluticasone (FLONASE) 50 MCG/ACT nasal spray [Pharmacy Med Name: FLUTICASONE PROP 50 MCG SPRAY]  2     Sig: SPRAY 1 SPRAY INTO EACH NOSTRIL EVERY DAY        Last Filled:  07/16/2024 #1 2rf     Patient Phone Number: [phone] (home)     Last appt: 10/16/2024   Next appt: 4/16/2025    Last OARRS:        No data to display

## 2025-01-08 RX ORDER — DILTIAZEM HYDROCHLORIDE 60 MG/1
TABLET, FILM COATED ORAL
Qty: 10.2 EACH | Refills: 2 | Status: SHIPPED | OUTPATIENT
Start: 2025-01-08

## 2025-01-08 NOTE — TELEPHONE ENCOUNTER
Medication:   Requested Prescriptions     Pending Prescriptions Disp Refills    SYMBICORT 80-4.5 MCG/ACT AERO [Pharmacy Med Name: SYMBICORT 80-4.5 MCG INHALER] 10.2 each 2     Sig: INHALE 2 PUFFS BY MOUTH TWICE DAILY. RINSE MOUTH AFTER EACH USE. (REPLACES FLOVENT)        Last Filled:  10/07/2024 #1 2rf     Patient Phone Number: [phone] (home)     Last appt: 10/16/2024   Next appt: 4/16/2025    Last OARRS:        No data to display

## 2025-01-20 DIAGNOSIS — Z00.00 ANNUAL PHYSICAL EXAM: ICD-10-CM

## 2025-01-20 RX ORDER — HYDROXYZINE PAMOATE 50 MG/1
50 CAPSULE ORAL 3 TIMES DAILY PRN
Qty: 270 CAPSULE | Refills: 1 | Status: SHIPPED | OUTPATIENT
Start: 2025-01-20

## 2025-01-20 NOTE — TELEPHONE ENCOUNTER
Medication:   Requested Prescriptions     Pending Prescriptions Disp Refills    hydrOXYzine pamoate (VISTARIL) 50 MG capsule [Pharmacy Med Name: HYDROXYZINE STACEY 50 MG CAP] 270 capsule 1     Sig: TAKE 1 CAPSULE BY MOUTH 3 TIMES DAILY AS NEEDED FOR ANXIETY.        Last Filled:  07/16/2024 #90 0rf     Patient Phone Number: [phone] (home)     Last appt: 10/16/2024   Next appt: 4/16/2025    Last OARRS:        No data to display

## 2025-01-29 DIAGNOSIS — J30.9 CHRONIC ALLERGIC RHINITIS: ICD-10-CM

## 2025-01-29 DIAGNOSIS — E11.42 TYPE 2 DIABETES MELLITUS WITH DIABETIC POLYNEUROPATHY, WITHOUT LONG-TERM CURRENT USE OF INSULIN (HCC): ICD-10-CM

## 2025-01-29 DIAGNOSIS — F51.01 PRIMARY INSOMNIA: ICD-10-CM

## 2025-01-30 RX ORDER — DOCUSATE SODIUM 100 MG/1
100 CAPSULE, LIQUID FILLED ORAL DAILY PRN
Qty: 30 CAPSULE | Refills: 0 | Status: SHIPPED | OUTPATIENT
Start: 2025-01-30 | End: 2025-03-01

## 2025-01-30 RX ORDER — SITAGLIPTIN 100 MG/1
100 TABLET, FILM COATED ORAL DAILY
Qty: 90 TABLET | Refills: 1 | Status: SHIPPED | OUTPATIENT
Start: 2025-01-30

## 2025-01-30 RX ORDER — ALBUTEROL SULFATE 90 UG/1
2 INHALANT RESPIRATORY (INHALATION) EVERY 6 HOURS PRN
Qty: 8.5 EACH | Refills: 3 | Status: SHIPPED | OUTPATIENT
Start: 2025-01-30

## 2025-01-30 RX ORDER — TRAZODONE HYDROCHLORIDE 50 MG/1
50 TABLET, FILM COATED ORAL NIGHTLY
Qty: 90 TABLET | Refills: 1 | Status: SHIPPED | OUTPATIENT
Start: 2025-01-30

## 2025-01-30 NOTE — TELEPHONE ENCOUNTER
Medication:   Requested Prescriptions     Pending Prescriptions Disp Refills    JANUVIA 100 MG tablet [Pharmacy Med Name: JANUVIA 100 MG TABLET] 90 tablet 1     Sig: TAKE 1 TABLET BY MOUTH EVERY DAY    albuterol sulfate HFA (PROVENTIL;VENTOLIN;PROAIR) 108 (90 Base) MCG/ACT inhaler [Pharmacy Med Name: ALBUTEROL HFA (PROAIR) INHALER] 8.5 each 3     Sig: INHALE 2 PUFFS BY MOUTH EVERY 6 HOURS AS NEEDED FOR WHEEZING    docusate sodium (COLACE) 100 MG capsule [Pharmacy Med Name: DOCUSATE SODIUM 100 MG SOFTGEL] 30 capsule 0     Sig: TAKE 1 CAPSULE BY MOUTH DAILY AS NEEDED FOR CONSTIPATION.    traZODone (DESYREL) 50 MG tablet [Pharmacy Med Name: TRAZODONE 50 MG TABLET] 90 tablet 1     Sig: TAKE 1 TABLET BY MOUTH EVERY DAY AT NIGHT       Last Filled:      Patient Phone Number: [phone] (home)     Last appt: 10/16/2024   Next appt: 4/16/2025    Last Labs DM:   Lab Results   Component Value Date/Time    LABA1C 8.6 07/16/2024 11:42 AM

## 2025-02-27 ENCOUNTER — OFFICE VISIT (OUTPATIENT)
Dept: FAMILY MEDICINE CLINIC | Age: 55
End: 2025-02-27

## 2025-02-27 VITALS
HEIGHT: 72 IN | HEART RATE: 95 BPM | BODY MASS INDEX: 28.99 KG/M2 | TEMPERATURE: 97.5 F | OXYGEN SATURATION: 99 % | SYSTOLIC BLOOD PRESSURE: 137 MMHG | WEIGHT: 214 LBS | DIASTOLIC BLOOD PRESSURE: 89 MMHG

## 2025-02-27 DIAGNOSIS — E78.2 MIXED HYPERLIPIDEMIA: ICD-10-CM

## 2025-02-27 DIAGNOSIS — M1A.0710 CHRONIC GOUT OF RIGHT ANKLE, UNSPECIFIED CAUSE: ICD-10-CM

## 2025-02-27 DIAGNOSIS — I10 PRIMARY HYPERTENSION: ICD-10-CM

## 2025-02-27 DIAGNOSIS — Z02.71 ENCOUNTER FOR DISABILITY ASSESSMENT: Primary | ICD-10-CM

## 2025-02-27 DIAGNOSIS — E11.9 TYPE 2 DIABETES MELLITUS WITHOUT COMPLICATION, WITHOUT LONG-TERM CURRENT USE OF INSULIN (HCC): ICD-10-CM

## 2025-02-27 RX ORDER — PREDNISONE 10 MG/1
10 TABLET ORAL 2 TIMES DAILY
COMMUNITY
Start: 2025-01-22

## 2025-02-27 RX ORDER — BUDESONIDE AND FORMOTEROL FUMARATE DIHYDRATE 160; 4.5 UG/1; UG/1
AEROSOL RESPIRATORY (INHALATION)
COMMUNITY
Start: 2025-01-22

## 2025-02-27 SDOH — ECONOMIC STABILITY: FOOD INSECURITY: WITHIN THE PAST 12 MONTHS, YOU WORRIED THAT YOUR FOOD WOULD RUN OUT BEFORE YOU GOT MONEY TO BUY MORE.: NEVER TRUE

## 2025-02-27 SDOH — ECONOMIC STABILITY: FOOD INSECURITY: WITHIN THE PAST 12 MONTHS, THE FOOD YOU BOUGHT JUST DIDN'T LAST AND YOU DIDN'T HAVE MONEY TO GET MORE.: NEVER TRUE

## 2025-02-27 ASSESSMENT — PATIENT HEALTH QUESTIONNAIRE - PHQ9
SUM OF ALL RESPONSES TO PHQ9 QUESTIONS 1 & 2: 0
SUM OF ALL RESPONSES TO PHQ QUESTIONS 1-9: 0
2. FEELING DOWN, DEPRESSED OR HOPELESS: NOT AT ALL
SUM OF ALL RESPONSES TO PHQ QUESTIONS 1-9: 0
1. LITTLE INTEREST OR PLEASURE IN DOING THINGS: NOT AT ALL
SUM OF ALL RESPONSES TO PHQ QUESTIONS 1-9: 0
SUM OF ALL RESPONSES TO PHQ QUESTIONS 1-9: 0

## 2025-02-27 NOTE — PROGRESS NOTES
and affect. his   behavior is normal.         Health Maintenance   Topic Date Due    HIV screen  Never done    Diabetic retinal exam  Never done    Hepatitis C screen  Never done    Hepatitis B vaccine (1 of 3 - 19+ 3-dose series) Never done    Diabetic Alb to Cr ratio (uACR) test  10/08/2022    Lipids  10/08/2022    GFR test (Diabetes, CKD 3-4, OR last GFR 15-59)  10/08/2022    COVID-19 Vaccine (2 - 2024-25 season) 09/01/2024    Colorectal Cancer Screen  05/09/2025    Diabetic foot exam  07/16/2025    A1C test (Diabetic or Prediabetic)  07/16/2025    DTaP/Tdap/Td vaccine (2 - Td or Tdap) 01/01/2026    Depression Screen  02/27/2026    Flu vaccine  Completed    Shingles vaccine  Completed    Pneumococcal 50+ years Vaccine  Completed    Hepatitis A vaccine  Aged Out    Hib vaccine  Aged Out    Polio vaccine  Aged Out    Meningococcal (ACWY) vaccine  Aged Out    Depression Monitoring  Discontinued    Pneumococcal 0-49 years Vaccine  Discontinued          Assessment/Plan:     1. Encounter for disability assessment  Advised to see occupational therapist for disability forms.  - [facility]    2. Type 2 diabetes mellitus without complication, without long-term current use of insulin (HCC)  Continue glipizide 10 mg daily extended release and Januvia 100 mg daily advised to get the blood work.  - CBC with Auto Differential; Future  - Comprehensive Metabolic Panel; Future  - Lipid Panel; Future  - Hemoglobin A1C; Future    3. Chronic gout of right ankle, unspecified cause  Continue the allopurinol.    4. Mixed hyperlipidemia  Advised to get the blood work    5. Primary hypertension  Blood pressure has been stable continue lisinopril 5 mg daily    Yuni Camilo MD  2/27/2025 4:34 PM

## 2025-03-06 ENCOUNTER — TELEPHONE (OUTPATIENT)
Dept: FAMILY MEDICINE CLINIC | Age: 55
End: 2025-03-06

## 2025-03-06 NOTE — TELEPHONE ENCOUNTER
Fabby from [employer] [phone] called for pt medical records but was sent to [facility]. Also asked about  treatment source questionnaire but was informed that Occupational Health has to fill out those papers since it is for disability.

## 2025-04-09 RX ORDER — DOCUSATE SODIUM 100 MG
100 CAPSULE ORAL DAILY PRN
Qty: 30 CAPSULE | Refills: 0 | Status: SHIPPED | OUTPATIENT
Start: 2025-04-09

## 2025-04-09 NOTE — TELEPHONE ENCOUNTER
Medication:   Requested Prescriptions     Pending Prescriptions Disp Refills    [employer] STOOL SOFTENER 100 MG capsule [Pharmacy Med Name: [employer] STOOL SOFTENER 100 MG SFGL] 30 capsule 0     Sig: TAKE 1 CAPSULE BY MOUTH DAILY AS NEEDED FOR CONSTIPATION.        Last Filled:      Patient Phone Number: [phone] (home)     Last appt: 2/27/2025   Next appt: 4/16/2025    Last OARRS:        No data to display

## 2025-04-21 DIAGNOSIS — E11.42 TYPE 2 DIABETES MELLITUS WITH DIABETIC POLYNEUROPATHY, WITHOUT LONG-TERM CURRENT USE OF INSULIN (HCC): ICD-10-CM

## 2025-04-21 RX ORDER — GLIPIZIDE 10 MG/1
10 TABLET, FILM COATED, EXTENDED RELEASE ORAL DAILY
Qty: 90 TABLET | Refills: 1 | Status: SHIPPED | OUTPATIENT
Start: 2025-04-21

## 2025-04-21 NOTE — TELEPHONE ENCOUNTER
Medication:   Requested Prescriptions     Pending Prescriptions Disp Refills    glipiZIDE (GLUCOTROL XL) 10 MG extended release tablet 90 tablet 1     Sig: Take 1 tablet by mouth daily       Last Filled:  11/07/2024 90 tablet, Refills: 1 ordered     Patient Phone Number: [phone] (home)     Last appt: 2/27/2025   Next appt: Visit date not found    Last Labs DM:   Lab Results   Component Value Date/Time    LABA1C 8.6 07/16/2024 11:42 AM

## 2025-04-21 NOTE — TELEPHONE ENCOUNTER
Patient requesting refill of...  glipiZIDE (GLUCOTROL XL) 10 MG extended release tablet [[id number]]    Order Details    Dose: 10 mg Route: Oral Frequency: DAILY   Dispense Quantity: 90 tablet Refills: 1          Sig: TAKE 1 TABLET BY MOUTH EVERY DAY             Last visit date: 2/27/2025    Pharmacy...[facility]/pharmacy #[id number] - [place], [place] - [address] - P [phone] - F [phone]

## 2025-05-07 DIAGNOSIS — E11.42 TYPE 2 DIABETES MELLITUS WITH DIABETIC POLYNEUROPATHY, WITHOUT LONG-TERM CURRENT USE OF INSULIN (HCC): ICD-10-CM

## 2025-05-07 RX ORDER — GABAPENTIN 300 MG/1
300 CAPSULE ORAL NIGHTLY
Qty: 30 CAPSULE | Refills: 1 | Status: SHIPPED | OUTPATIENT
Start: 2025-05-07 | End: 2025-06-07

## 2025-05-07 RX ORDER — DOCUSATE SODIUM 100 MG/1
100 CAPSULE, LIQUID FILLED ORAL DAILY PRN
Qty: 30 CAPSULE | Refills: 0 | Status: SHIPPED | OUTPATIENT
Start: 2025-05-07

## 2025-05-07 NOTE — TELEPHONE ENCOUNTER
Medication:   Requested Prescriptions     Pending Prescriptions Disp Refills    gabapentin (NEURONTIN) 300 MG capsule [Pharmacy Med Name: GABAPENTIN 300 MG CAPSULE] 30 capsule 1     Sig: Take 1 capsule by mouth nightly for 31 days.    docusate sodium (COLACE) 100 MG capsule [Pharmacy Med Name: DOCUSATE SODIUM 100 MG SOFTGEL] 30 capsule 0     Sig: TAKE 1 CAPSULE BY MOUTH DAILY AS NEEDED FOR CONSTIPATION.        Last Filled:      Patient Phone Number: [phone] (home)     Last appt: 2/27/2025   Next appt: Visit date not found    Last OARRS:        No data to display

## 2025-05-27 DIAGNOSIS — J30.9 CHRONIC ALLERGIC RHINITIS: ICD-10-CM

## 2025-05-27 RX ORDER — FLUTICASONE PROPIONATE 50 MCG
SPRAY, SUSPENSION (ML) NASAL
Qty: 16 ML | Refills: 2 | Status: SHIPPED | OUTPATIENT
Start: 2025-05-27

## 2025-05-27 NOTE — TELEPHONE ENCOUNTER
Medication:   Requested Prescriptions     Pending Prescriptions Disp Refills    fluticasone (FLONASE) 50 MCG/ACT nasal spray [Pharmacy Med Name: FLUTICASONE PROP 50 MCG SPRAY] 16 mL 2     Sig: SPRAY 1 SPRAY INTO EACH NOSTRIL EVERY DAY        Last Filled:  01/02/2025 #1 2rf     Patient Phone Number: [phone] (home)     Last appt: 2/27/2025   Next appt: Visit date not found    Last OARRS:        No data to display

## 2025-06-04 RX ORDER — DOCUSATE SODIUM 100 MG/1
100 CAPSULE, LIQUID FILLED ORAL DAILY PRN
Qty: 30 CAPSULE | Refills: 0 | Status: SHIPPED | OUTPATIENT
Start: 2025-06-04

## 2025-06-04 NOTE — TELEPHONE ENCOUNTER
Medication:   Requested Prescriptions     Pending Prescriptions Disp Refills    docusate sodium (COLACE) 100 MG capsule [Pharmacy Med Name: DOCUSATE SODIUM 100 MG SOFTGEL] 30 capsule 0     Sig: TAKE 1 CAPSULE BY MOUTH DAILY AS NEEDED FOR CONSTIPATION.        Last Filled:  5/07/2025 30 capsule, Refills: 0 ordered     Patient Phone Number: [phone] (home)     Last appt: 2/27/2025   Next appt: Visit date not found    Last OARRS:        No data to display

## 2025-06-18 DIAGNOSIS — J30.9 CHRONIC ALLERGIC RHINITIS: ICD-10-CM

## 2025-06-18 RX ORDER — ALBUTEROL SULFATE 90 UG/1
2 INHALANT RESPIRATORY (INHALATION) EVERY 6 HOURS PRN
Qty: 8.5 EACH | Refills: 3 | Status: SHIPPED | OUTPATIENT
Start: 2025-06-18

## 2025-06-18 RX ORDER — SERTRALINE HYDROCHLORIDE 100 MG/1
200 TABLET, FILM COATED ORAL DAILY
COMMUNITY
Start: 2025-06-09

## 2025-06-18 NOTE — TELEPHONE ENCOUNTER
Medication:   Requested Prescriptions     Pending Prescriptions Disp Refills    albuterol sulfate HFA (PROVENTIL;VENTOLIN;PROAIR) 108 (90 Base) MCG/ACT inhaler [Pharmacy Med Name: ALBUTEROL HFA (PROAIR) INHALER] 8.5 each 3     Sig: INHALE 2 PUFFS BY MOUTH EVERY 6 HOURS AS NEEDED FOR WHEEZING        Last Filled:  1/30/2025 8.5 each, Refills: 3 ordered     Patient Phone Number: [phone] (home)     Last appt: 2/27/2025   Next appt: Visit date not found    Last OARRS:        No data to display

## 2025-07-02 RX ORDER — DOCUSATE SODIUM 100 MG/1
100 CAPSULE, LIQUID FILLED ORAL DAILY PRN
Qty: 30 CAPSULE | Refills: 0 | Status: SHIPPED | OUTPATIENT
Start: 2025-07-02

## 2025-07-02 NOTE — TELEPHONE ENCOUNTER
Medication:   Requested Prescriptions     Pending Prescriptions Disp Refills    docusate sodium (COLACE) 100 MG capsule [Pharmacy Med Name: DOCUSATE SODIUM 100 MG SOFTGEL] 30 capsule 0     Sig: TAKE 1 CAPSULE BY MOUTH DAILY AS NEEDED FOR CONSTIPATION.        Last Filled:  6/4/2025 30 caps 0 refills     Patient Phone Number: [phone] (home)     Last appt: 2/27/2025   Next appt: Visit date not found    Last OARRS:        No data to display

## 2025-07-06 DIAGNOSIS — I10 PRIMARY HYPERTENSION: ICD-10-CM

## 2025-07-07 RX ORDER — LISINOPRIL 5 MG/1
5 TABLET ORAL DAILY
Qty: 90 TABLET | Refills: 1 | Status: SHIPPED | OUTPATIENT
Start: 2025-07-07

## 2025-07-07 NOTE — TELEPHONE ENCOUNTER
Medication:   Requested Prescriptions     Pending Prescriptions Disp Refills    lisinopril (PRINIVIL;ZESTRIL) 5 MG tablet [Pharmacy Med Name: LISINOPRIL 5 MG TABLET] 90 tablet 1     Sig: TAKE 1 TABLET BY MOUTH EVERY DAY        Last Filled:  11/27/2024 #90 1rf     Patient Phone Number: [phone] (home)     Last appt: 2/27/2025   Next appt: Visit date not found    Last OARRS:        No data to display

## 2025-07-15 DIAGNOSIS — J30.9 CHRONIC ALLERGIC RHINITIS: ICD-10-CM

## 2025-07-16 RX ORDER — FLUTICASONE PROPIONATE 50 MCG
SPRAY, SUSPENSION (ML) NASAL
Qty: 48 ML | Refills: 1 | Status: SHIPPED | OUTPATIENT
Start: 2025-07-16

## 2025-07-16 RX ORDER — FLUTICASONE PROPIONATE 50 MCG
SPRAY, SUSPENSION (ML) NASAL
Qty: 48 ML | Refills: 1 | Status: SHIPPED | OUTPATIENT
Start: 2025-07-16 | End: 2025-07-16 | Stop reason: SDUPTHER

## 2025-07-16 NOTE — TELEPHONE ENCOUNTER
Medication:   Requested Prescriptions     Pending Prescriptions Disp Refills    fluticasone (FLONASE) 50 MCG/ACT nasal spray [Pharmacy Med Name: FLUTICASONE PROP 50 MCG SPRAY] 48 mL 1     Sig: SPRAY 1 SPRAY INTO EACH NOSTRIL EVERY DAY        Last Filled:  05/27/2025 #1 2rf    Patient Phone Number: [phone] (home)     Last appt: 2/27/2025   Next appt: Visit date not found    Last OARRS:        No data to display

## 2025-07-23 ENCOUNTER — TELEMEDICINE (OUTPATIENT)
Dept: FAMILY MEDICINE CLINIC | Age: 55
End: 2025-07-23

## 2025-07-23 DIAGNOSIS — L23.9 ALLERGIC CONTACT DERMATITIS, UNSPECIFIED TRIGGER: ICD-10-CM

## 2025-07-23 DIAGNOSIS — E11.42 TYPE 2 DIABETES MELLITUS WITH DIABETIC POLYNEUROPATHY, WITHOUT LONG-TERM CURRENT USE OF INSULIN (HCC): Primary | ICD-10-CM

## 2025-07-23 PROCEDURE — 99213 OFFICE O/P EST LOW 20 MIN: CPT | Performed by: FAMILY MEDICINE

## 2025-07-23 RX ORDER — TRIAMCINOLONE ACETONIDE 1 MG/G
CREAM TOPICAL
COMMUNITY
Start: 2025-07-16

## 2025-07-24 PROBLEM — E11.42 TYPE 2 DIABETES MELLITUS WITH DIABETIC POLYNEUROPATHY, WITHOUT LONG-TERM CURRENT USE OF INSULIN (HCC): Status: ACTIVE | Noted: 2025-07-24

## 2025-07-24 NOTE — PROGRESS NOTES
Hallucinations            ASSESSMENT/PLAN:  1. Type 2 diabetes mellitus with diabetic polyneuropathy, without long-term current use of insulin (HCC)  [facility]    2. Allergic contact dermatitis, unspecified trigger  Advised to continue to apply topical steroids.  No concerns of cellulitis.  Advised against antibiotics          Paul Crandall, was evaluated through a synchronous (real-time) audio-video encounter. The patient (or guardian if applicable) is aware that this is a billable service, which includes applicable co-pays. This Virtual Visit was conducted with patient's (and/or legal guardian's) consent. Patient identification was verified, and a caregiver was present when appropriate.   The patient was located at Home: [address] [place] [address]  Provider was located at Facility (Appt Dept): [address] [place] [address]  Confirm you are appropriately licensed, registered, or certified to deliver care in the [facility] where the patient is located as indicated above. If you are not or unsure, please re-schedule the visit: Yes, I confirm.       Total time spent on this encounter: Not billed by time    --Yuni Camilo MD on 7/24/2025 at 6:38 AM      An electronic signature was used to authenticate this note..

## 2025-07-28 RX ORDER — TRIAMCINOLONE ACETONIDE 1 MG/G
CREAM TOPICAL
Qty: 60 G | Refills: 5 | Status: SHIPPED | OUTPATIENT
Start: 2025-07-28

## 2025-07-28 NOTE — TELEPHONE ENCOUNTER
Patient was seen on 07/23/2025 virtual visit and was told to call office if needed medication refilled.  Patient is needing the triamcinolone (KENALOG) 0.1 % cream refilled    Please advise; thank you!    Patient Phone Number: [phone] (home)     Last appt: 7/23/2025   Next appt: Visit date not found

## 2025-07-30 RX ORDER — DOCUSATE SODIUM 100 MG/1
100 CAPSULE, LIQUID FILLED ORAL DAILY PRN
Qty: 30 CAPSULE | Refills: 0 | Status: SHIPPED | OUTPATIENT
Start: 2025-07-30

## 2025-07-30 NOTE — TELEPHONE ENCOUNTER
Medication:   Requested Prescriptions     Pending Prescriptions Disp Refills    docusate sodium (COLACE) 100 MG capsule [Pharmacy Med Name: DOCUSATE SODIUM 100 MG SOFTGEL] 30 capsule 0     Sig: TAKE 1 CAPSULE BY MOUTH DAILY AS NEEDED FOR CONSTIPATION.        Last Filled:  07/02/2025 #30 0rf     Patient Phone Number: [phone] (home)     Last appt: 7/23/2025   Next appt: Visit date not found    Last OARRS:        No data to display